# Patient Record
Sex: FEMALE | Race: WHITE | Employment: FULL TIME | URBAN - METROPOLITAN AREA
[De-identification: names, ages, dates, MRNs, and addresses within clinical notes are randomized per-mention and may not be internally consistent; named-entity substitution may affect disease eponyms.]

---

## 2020-09-17 ENCOUNTER — VIRTUAL VISIT (OUTPATIENT)
Dept: ENDOCRINOLOGY | Age: 25
End: 2020-09-17
Payer: COMMERCIAL

## 2020-09-17 ENCOUNTER — PATIENT MESSAGE (OUTPATIENT)
Dept: ENDOCRINOLOGY | Age: 25
End: 2020-09-17

## 2020-09-17 ENCOUNTER — TELEPHONE (OUTPATIENT)
Dept: ENDOCRINOLOGY | Age: 25
End: 2020-09-17

## 2020-09-17 PROCEDURE — 99204 OFFICE O/P NEW MOD 45 MIN: CPT | Performed by: INTERNAL MEDICINE

## 2020-09-17 RX ORDER — LEVOTHYROXINE SODIUM 0.1 MG/1
TABLET ORAL DAILY
COMMUNITY
Start: 2020-07-24 | End: 2020-12-22

## 2020-09-17 RX ORDER — LAMOTRIGINE 100 MG/1
TABLET ORAL
COMMUNITY
Start: 2020-09-14 | End: 2021-04-22

## 2020-09-17 RX ORDER — QUETIAPINE FUMARATE 200 MG/1
25 TABLET, FILM COATED ORAL
COMMUNITY
Start: 2020-09-11 | End: 2022-09-20

## 2020-09-17 RX ORDER — LAMOTRIGINE 150 MG/1
TABLET ORAL 2 TIMES DAILY
COMMUNITY
Start: 2020-08-18

## 2020-09-17 RX ORDER — LURASIDONE HYDROCHLORIDE 80 MG/1
20 TABLET, FILM COATED ORAL DAILY
COMMUNITY
Start: 2020-09-12 | End: 2022-09-20

## 2020-09-17 RX ORDER — NORGESTREL AND ETHINYL ESTRADIOL 0.3-0.03MG
KIT ORAL DAILY
COMMUNITY
Start: 2020-09-11 | End: 2021-04-22

## 2020-09-17 RX ORDER — METFORMIN HYDROCHLORIDE 750 MG/1
TABLET, EXTENDED RELEASE ORAL 2 TIMES DAILY
COMMUNITY
Start: 2020-09-08 | End: 2020-12-10

## 2020-09-17 RX ORDER — LITHIUM CARBONATE 300 MG/1
TABLET, FILM COATED, EXTENDED RELEASE ORAL 2 TIMES DAILY
COMMUNITY
Start: 2020-08-18 | End: 2021-09-02 | Stop reason: SINTOL

## 2020-09-17 SDOH — HEALTH STABILITY: MENTAL HEALTH: HOW OFTEN DO YOU HAVE A DRINK CONTAINING ALCOHOL?: NEVER

## 2020-09-17 NOTE — TELEPHONE ENCOUNTER
Kali message sent from patient:    Hello, I have two questions. First, I take Metformin 750 mg twice a day and I have been told to start taking both pills at the same time in the morning. But this is an extended release and I am not sure what the benefit is to take it all at the same time as opposed to once in the morning and once in the evening.

## 2020-09-17 NOTE — PROGRESS NOTES
SUBJECTIVE:  America Huang is a 23 y.o. female who is seen here for Hashimoto's hypothyroidism which she developed after starting lithium therapy for her bipolar disorder. Patient was diagnosed with Hypothyroidism approximately 2015 . Symptoms at presentation were   current complaints: fatigue- and abnormal TSH level of 45 in June 2015   history of obstructive symptoms: difficulty swallowing No, changes in voice/hoarseness No.history of radiation to patient's neck: NO  Recent iodine exposure: No  Family history includes no thyroid abnormalities. Family history of thyroid cancer:   Notes from [facility]s [facility]   \"Sajan was first seen in [facility] 8/2015 when she presented with PMH of bipolar disorder with psychotic features, who had abnormal screening thyroid studies performed while on lithium therapy. Lithium was started last March 2014, around the same time as a hospital admission for SI and psychosis. TSH at that time was only slightly elevated at 4.69 (0.43 - 4.00 mcIU/mL). Screening thyroid studies done on 6/5/15 showed low free T4: 0.6, elevated TSH: 45.8 and normal total T3: 0.73. Thyroglobulin antibodies were also positive at 750.2 ( IU/mL). 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 \"  Previously struggled with gender dysphoria in 2014 and was seen in the transgender clinic in November 2014. She was never placed on replacement and according to the parents at the time it was a phase she was too depressed at that time and hence did not know her feelings  Previously had severe mood disorders including severe depression and suicidal ideation.   She has been treated for ADHD in the past   She has bipolar dosirder  and has been on Lithium since 2015 and     Menarche at age 8   Heavy periods PMS and would become suicidal before starting her menstrual cycle so she was started on birth control ---she takes OCP       Past Medical History:   Diagnosis Date    Dysmenorrhea     Hyperlipidemia     Hypertension     Hypothyroidism     Vitamin D deficiency     Weight gain      There are no active problems to display for this patient. History reviewed. No pertinent surgical history.   Family History   Problem Relation Age of Onset    Cancer Paternal Grandmother     Hypothyroidism Paternal Grandfather      Social History     Socioeconomic History    Marital status: Single     Spouse name: None    Number of children: None    Years of education: None    Highest education level: None   Occupational History    None   Social Needs    Financial resource strain: None    Food insecurity     Worry: None     Inability: None    Transportation needs     Medical: None     Non-medical: None   Tobacco Use    Smoking status: Never Smoker    Smokeless tobacco: Never Used   Substance and Sexual Activity    Alcohol use: Never     Frequency: Never    Drug use: Never    Sexual activity: None   Lifestyle    Physical activity     Days per week: None     Minutes per session: None    Stress: None   Relationships    Social connections     Talks on phone: None     Gets together: None     Attends Mandaen service: None     Active member of club or organization: None     Attends meetings of clubs or organizations: None     Relationship status: None    Intimate partner violence     Fear of current or ex partner: None     Emotionally abused: None     Physically abused: None     Forced sexual activity: None   Other Topics Concern    None   Social History Narrative    None     Current Outpatient Medications   Medication Sig Dispense Refill    lithium (LITHOBID) 300 MG extended release tablet 2 times daily       LATUDA 80 MG TABS tablet daily       lamoTRIgine (LAMICTAL) 150 MG tablet Pt takes 1 tab in the am      lamoTRIgine (LAMICTAL) 100 MG tablet Pt takes 1 tab in the pm      QUEtiapine (SEROQUEL) 200 MG tablet 150 mg       metFORMIN (GLUCOPHAGE-XR) 750 MG extended release tablet 2 times daily       levothyroxine (SYNTHROID) 100 MCG tablet Daily       LOW-OGESTREL 0.3-30 MG-MCG per tablet daily        No current facility-administered medications for this visit. No Known Allergies      Review of Systems:  Constitutional  Patient denies any weight loss denies any weight gain,  Eyes   Pt denies any double vision blurred vision or decreased peripheral vision  Head ear nose throat  Denies any trouble swallowing denies any hoarseness  Denies any shortness of breath denies  Cardiovascular  Denies any chest pain denies any palpitations currently  Gastrointestinal  Denies any nausea ,vomiting ,constipation or diarrhea  Hematological/lymphatic  Denies any blood clot denies or any painful lymph nodes  Genitourinary  Denies any frequent urination denies any nighttime urination  Skin  Denies any acne denies any changes in facial body hair denies any non healing wounds Musculoskeletal   denies any joint or bone pain ,denies any muscle weakness ,denies any new fracture  Endocrine  Denies any excessive thirst denies any excessive heat intolerance or cold intolerance . OBJECTIVE:   There were no vitals taken for this visit.   Wt Readings from Last 3 Encounters:   No data found for Wt       Physical Exam:    Constitutional: no acute distress, well appearing and well nourished  Psychiatric: oriented to person, place and time, judgement and insight and normal, recent and remote memory intact and mood and affect are normal  Skin: skin and subcutaneous tissue is normal without visible mass,   Head and Face: visual inspection  of head and face revealed no abnormalities  Eyes: visual inspection showed no lid or conjunctival swelling, erythema or discharge, pupils are normal, equal, round  Ears/Nose: external inspection of ears and nose revealed no abnormalities, hearing is grossly normal  Oropharynx/Mouth/Face: lips, tongue and gums appear  normal with no lesions, the voice quality was normal  Neck: neck appears symmetric, with no visible masses,   Pulmonary: no increased work of breathing or signs of respiratory distress,  Musculoskeletal: normal on inspection    Neurological: normal coordination and normal general cortical function      Lab Review:  No results found for: TSH  No results found for: T4FREE     ASSESSMENT/PLAN:  1. Hypothyroidism due to medication  Patient was initially diagnosed as lithium-induced hypothyroidism appears that she had slightly abnormal thyroid function test prior to starting lithium and also had positive antithyroglobulin antibodies at that time. She is currently taking 100 mcg of levothyroxine without any concomitant medication  She is noted to have an elevated TSH of 5.77 in August 2020, patient thinks that it was due to the laxative she was taking and not absorbing her thyroid medication since then she has stopped the laxative and has no trouble with absorption and feels clinically euthyroid she was given blood work order to get blood work done now with thyroid hormone levels and repeat in 3 to 4 months    --She is noted to be on metformin as per her med list although patient did not mention that she has been taking the metformin.    - TSH without Reflex; Future  - Anti-Thyroglobulin Antibody; Future  - Thyroid Peroxidase Antibody; Future  - T4, Free; Future  - TSH without Reflex; Future  - T4, Free; Future    2.  Bipolar 1 disorder and generalized anxiety disorder     patient is on lithium for years appears to be stable currently  She also takes Seroquel and Latuda and Lamictal  She was previously following up with psychiatry at [facility] but now also follows with counselor at [facility] and/or ordered clinical lab results Yes  Reviewed and/or ordered radiology tests Yes   Reviewed and/or ordered other diagnostic tests Yes  Made a decision to obtain old records Yes  Reviewed and summarized old records Yes      Ciat Mendoza was counseled regarding symptoms of current diagnosis, course and complications of disease if inadequately treated, side effects of medications, diagnosis, treatment options, and prognosis, risks, benefits, complications, and alternatives of treatment, labs, imaging and other studies and treatment targets and goals. She understands instructions and counseling. TELEHEALTH EVALUATION -- Audio/Visual (During [address] emergency)  Pursuant to the emergency declaration under the [address] [place] [address], [facility] waiver authority and the [employer] and [employer] Act, this Virtual  Visit was conducted, with patient's consent, to reduce the patient's risk of exposure to COVID-19 and provide care for  patient. Services were provided through a video synchronous discussion virtually to substitute for in-person clinic visit. Patient's location : home     Patient provided verbal consent to use the video visit. Total time spent : 50 minutes reviewing the chart, conducting an interview, performing a limited exam by video and educating the patient on my assessment plan. Return in about 3 months (around 12/17/2020). Please note that some or all of this report was generated using voice recognition software. Please notify me in case of any questions about the content of this document, as some errors in transcription may have occurred .

## 2020-10-09 ENCOUNTER — PATIENT MESSAGE (OUTPATIENT)
Dept: ENDOCRINOLOGY | Age: 25
End: 2020-10-09

## 2020-10-12 ENCOUNTER — TELEPHONE (OUTPATIENT)
Dept: ENDOCRINOLOGY | Age: 25
End: 2020-10-12

## 2020-10-12 NOTE — TELEPHONE ENCOUNTER
From: Adán Rodríguez  To: Bonilla Londono MD  Sent: 10/9/2020 1:23 PM EDT  Subject: Prescription Question    Hello, I have worries about metformin because of news that it contains a carcinogen.   [email or web address]

## 2020-10-12 NOTE — TELEPHONE ENCOUNTER
Please advise patient due to conflicting data about Metformin at this time I would recommend she stay off of the Metformin and I would like to check her renal panel as well as A1c and a fasting lipid profile in a month after stopping Metformin.   We can decide at that time if he needs to restart it or not, hopefully she has a follow-up appointment approximately around the same time

## 2020-12-10 ENCOUNTER — VIRTUAL VISIT (OUTPATIENT)
Dept: ENDOCRINOLOGY | Age: 25
End: 2020-12-10
Payer: COMMERCIAL

## 2020-12-10 PROCEDURE — 99213 OFFICE O/P EST LOW 20 MIN: CPT | Performed by: INTERNAL MEDICINE

## 2020-12-10 RX ORDER — LINACLOTIDE 145 UG/1
CAPSULE, GELATIN COATED ORAL
COMMUNITY
Start: 2020-11-24 | End: 2021-04-22

## 2020-12-10 NOTE — PROGRESS NOTES
SUBJECTIVE:  Sherif Contreras is a 23 y.o. female who is seen here for Hashimoto's hypothyroidism which she developed after starting lithium therapy for her bipolar disorder. Patient was diagnosed with Hypothyroidism approximately 2015Patient was initially diagnosed as lithium-induced hypothyroidism appears that she had slightly abnormal thyroid function test prior to starting lithium and also had positive antithyroglobulin antibodies at that time. .  Symptoms at presentation were   current complaints: fatigue- and abnormal TSH level of 45 in June 2015   history of obstructive symptoms: difficulty swallowing No, changes in voice/hoarseness No.history of radiation to patient's neck: NO  Recent iodine exposure: No  Family history includes no thyroid abnormalities. Family history of thyroid cancer:   Notes from childrens [facility]   \"Sajan was first seen in [facility] 8/2015 when she presented with PMH of bipolar disorder with psychotic features, who had abnormal screening thyroid studies performed while on lithium therapy. Lithium was started last March 2014, around the same time as a hospital admission for SI and psychosis. TSH at that time was only slightly elevated at 4.69 (0.43 - 4.00 mcIU/mL). Screening thyroid studies done on 6/5/15 showed low free T4: 0.6, elevated TSH: 45.8 and normal total T3: 0.73. Thyroglobulin antibodies were also positive at 750.2 ( IU/mL). 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 \"  Previously struggled with gender dysphoria in 2014 and was seen in the transgender clinic in November 2014. She was never placed on replacement and according to the parents at the time it was a phase she was too depressed at that time and hence did not know her feelings  Previously had severe mood disorders including severe depression and suicidal ideation.   She has been treated for ADHD in the past   She has bipolar dosirder  and has been on Lithium since 2015 and     Menarche at age 8   Heavy periods PMS and would become suicidal before starting her menstrual cycle so she was started on birth control ---she takes OCP     INTERIM   She has gained a few lbs, she still struggles with severe constipation she has been using Linzess with little success. She denies any other heat or cold intolerance denies any polyuria     Past Medical History:   Diagnosis Date    Dysmenorrhea     Hyperlipidemia     Hypertension     Hypothyroidism     Vitamin D deficiency     Weight gain      There are no active problems to display for this patient. History reviewed. No pertinent surgical history.   Family History   Problem Relation Age of Onset    Cancer Paternal Grandmother     Hypothyroidism Paternal Grandfather      Social History     Socioeconomic History    Marital status: Single     Spouse name: None    Number of children: None    Years of education: None    Highest education level: None   Occupational History    None   Social Needs    Financial resource strain: None    Food insecurity     Worry: None     Inability: None    Transportation needs     Medical: None     Non-medical: None   Tobacco Use    Smoking status: Never Smoker    Smokeless tobacco: Never Used   Substance and Sexual Activity    Alcohol use: Never     Frequency: Never    Drug use: Never    Sexual activity: None   Lifestyle    Physical activity     Days per week: None     Minutes per session: None    Stress: None   Relationships    Social connections     Talks on phone: None     Gets together: None     Attends Yarsani service: None     Active member of club or organization: None     Attends meetings of clubs or organizations: None     Relationship status: None    Intimate partner violence     Fear of current or ex partner: None     Emotionally abused: None     Physically abused: None     Forced sexual activity: None   Other Topics Concern    None   Social History Narrative    None Current Outpatient Medications   Medication Sig Dispense Refill    LINZESS 145 MCG capsule       vitamin D (CHOLECALCIFEROL) 25 MCG (1000 UT) TABS tablet Take 1,000 Units by mouth      lithium (LITHOBID) 300 MG extended release tablet 2 times daily Pt takes 2 tabs in the am and 2 tabs in the pm      LATUDA 80 MG TABS tablet daily       lamoTRIgine (LAMICTAL) 150 MG tablet Pt takes 1 tab in the am      lamoTRIgine (LAMICTAL) 100 MG tablet Pt takes 1 tab in the pm      QUEtiapine (SEROQUEL) 200 MG tablet 100 mg       levothyroxine (SYNTHROID) 100 MCG tablet Daily       LOW-OGESTREL 0.3-30 MG-MCG per tablet daily        No current facility-administered medications for this visit. No Known Allergies      Review of Systems:  Constitutional  Patient denies any weight loss denies any weight gain,  Eyes   Pt denies any double vision blurred vision or decreased peripheral vision  Head ear nose throat  Denies any trouble swallowing denies any hoarseness  Denies any shortness of breath denies  Cardiovascular  Denies any chest pain denies any palpitations currently  Gastrointestinal  Denies any nausea ,vomiting , patient does complain of constipation or diarrhea  Hematological/lymphatic  Denies any blood clot denies or any painful lymph nodes  Genitourinary  Denies any frequent urination denies any nighttime urination  Skin  Denies any acne denies any changes in facial body hair denies any non healing wounds Musculoskeletal   denies any joint or bone pain ,denies any muscle weakness ,denies any new fracture  Endocrine  Denies any excessive thirst denies any excessive heat intolerance or cold intolerance . OBJECTIVE:   There were no vitals taken for this visit.   Wt Readings from Last 3 Encounters:   No data found for Wt       Physical Exam:    Constitutional: no acute distress, well appearing and well nourished  Psychiatric: oriented to person, place and time, judgement and insight and normal, recent and remote memory intact and mood and affect are normal  Skin: skin and subcutaneous tissue is normal without visible mass,   Head and Face: visual inspection  of head and face revealed no abnormalities  Eyes: visual inspection showed no lid or conjunctival swelling, erythema or discharge, pupils are normal, equal, round  Ears/Nose: external inspection of ears and nose revealed no abnormalities, hearing is grossly normal  Oropharynx/Mouth/Face: lips, tongue and gums appear  normal with no lesions, the voice quality was normal  Neck: neck appears symmetric, with no visible masses,   Pulmonary: no increased work of breathing or signs of respiratory distress,  Musculoskeletal: normal on inspection    Neurological: normal coordination and normal general cortical function      Lab Review:  No results found for: TSH  No results found for: T4FREE     ASSESSMENT/PLAN:    --- Hypothyroidism due to medication  She is currently taking 100 mcg of levothyroxine without any concomitant medication  She is noted to have an elevated TSH of 5.77 in August 2020,   She has not done any blood work recently I have advised her to get blood work done now and then will repeat before her next visit with me      Metformin stopped 3 months ago in September 2020, she thinks it was started to for weight loss, although review of chart it appears that because of her other psych meds like Seroquel and Latuda and Lamictal she was put on it for better metabolic profile    2.  Bipolar 1 disorder and generalized anxiety disorder     patient is on lithium for years appears to be stable currently  She also takes Seroquel and Latuda and Lamictal  She was previously following up with psychiatry at [facility] but now also follows with counselor at [facility] and/or ordered clinical lab results Yes  Reviewed and/or ordered radiology tests Yes   Reviewed and/or ordered other diagnostic tests Yes  Made a decision to obtain old records Yes  Reviewed and summarized old records Yes      Pollo Mireles was counseled regarding symptoms of current diagnosis, course and complications of disease if inadequately treated, side effects of medications, diagnosis, treatment options, and prognosis, risks, benefits, complications, and alternatives of treatment, labs, imaging and other studies and treatment targets and goals. She understands instructions and counseling. TELEHEALTH EVALUATION -- Audio/Visual (During [id number] public health emergency)  Pursuant to the emergency declaration under the [facility], [facility] waiver authority and the [employer] and [employer] Act, this Virtual  Visit was conducted, with patient's consent, to reduce the patient's risk of exposure to COVID-19 and provide care for  patient. Services were provided through a video synchronous discussion virtually to substitute for in-person clinic visit. Patient's location : home     Patient provided verbal consent to use the video visit. Total time spent : 15 minutes reviewing the chart, conducting an interview, performing a limited exam by video and educating the patient on my assessment plan. Return in about 3 months (around 3/10/2021). Please note that some or all of this report was generated using voice recognition software. Please notify me in case of any questions about the content of this document, as some errors in transcription may have occurred .

## 2020-12-10 NOTE — Clinical Note
Please email patient lab orders from the last visit as well as lab orders for the next appointment and please reji them as now and 3 months patient would need a follow-up in 3 months her email address is her [email or web address]

## 2020-12-18 NOTE — PROGRESS NOTES
Left VM for patient to call office back to schedule a f/u appt.  Reminder letter and lab orders emailed to patient per pt request.

## 2020-12-21 ENCOUNTER — TELEPHONE (OUTPATIENT)
Dept: ENDOCRINOLOGY | Age: 25
End: 2020-12-21

## 2020-12-22 ENCOUNTER — TELEPHONE (OUTPATIENT)
Dept: ENDOCRINOLOGY | Age: 25
End: 2020-12-22

## 2020-12-22 RX ORDER — LEVOTHYROXINE SODIUM 112 UG/1
112 TABLET ORAL DAILY
Qty: 30 TABLET | Refills: 3 | Status: SHIPPED | OUTPATIENT
Start: 2020-12-22 | End: 2021-04-26 | Stop reason: SDUPTHER

## 2021-04-11 ENCOUNTER — PATIENT MESSAGE (OUTPATIENT)
Dept: ENDOCRINOLOGY | Age: 26
End: 2021-04-11

## 2021-04-11 DIAGNOSIS — E03.2 HYPOTHYROIDISM DUE TO MEDICATION: Primary | ICD-10-CM

## 2021-04-12 ENCOUNTER — TELEPHONE (OUTPATIENT)
Dept: ENDOCRINOLOGY | Age: 26
End: 2021-04-12

## 2021-04-22 ENCOUNTER — VIRTUAL VISIT (OUTPATIENT)
Dept: ENDOCRINOLOGY | Age: 26
End: 2021-04-22
Payer: COMMERCIAL

## 2021-04-22 DIAGNOSIS — E55.9 VITAMIN D DEFICIENCY: ICD-10-CM

## 2021-04-22 DIAGNOSIS — E04.9 GOITER: ICD-10-CM

## 2021-04-22 DIAGNOSIS — E03.2 HYPOTHYROIDISM DUE TO MEDICATION: Primary | ICD-10-CM

## 2021-04-22 DIAGNOSIS — F31.9 BIPOLAR 1 DISORDER (HCC): ICD-10-CM

## 2021-04-22 PROCEDURE — 99213 OFFICE O/P EST LOW 20 MIN: CPT | Performed by: INTERNAL MEDICINE

## 2021-04-22 NOTE — Clinical Note
Please schedule patient a follow-up in 3 to 4 months and mail her the orders for the labs ordered in April some of them are supposed to be done now and some of them are going to be done at follow-up I have also placed an order for a thyroid ultrasound please mail her that she is going to get them done in [place] [id number]

## 2021-04-22 NOTE — PROGRESS NOTES
SUBJECTIVE:  Bia Song is a 27 y.o. female who is seen here for Hashimoto's hypothyroidism which she developed after starting lithium therapy for her bipolar disorder. Patient was diagnosed with Hypothyroidism approximately 2015Patient was initially diagnosed as lithium-induced hypothyroidism appears that she had slightly abnormal thyroid function test prior to starting lithium and also had positive antithyroglobulin antibodies at that time. .  Symptoms at presentation were   current complaints: fatigue and abnormal TSH level of 45 in June 2015   history of obstructive symptoms: difficulty swallowing No, changes in voice/hoarseness No.history of radiation to patient's neck: NO  Recent iodine exposure: No  Family history includes no thyroid abnormalities. Family history of thyroid cancer:   Notes from [facility]   \"Sajan was first seen in [facility] 8/2015 when she presented with PMH of bipolar disorder with psychotic features, who had abnormal screening thyroid studies performed while on lithium therapy. Lithium was started last March 2014, around the same time as a hospital admission for SI and psychosis. TSH at that time was only slightly elevated at 4.69 (0.43 - 4.00 mcIU/mL). Screening thyroid studies done on 6/5/15 showed low free T4: 0.6, elevated TSH: 45.8 and normal total T3: 0.73. Thyroglobulin antibodies were also positive at 750.2 ( IU/mL). 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 \"  Previously struggled with gender dysphoria in 2014 and was seen in the transgender clinic in November 2014. She was never placed on replacement and according to the parents at the time it was a phase she was too depressed at that time and hence did not know her feelings  Previously had severe mood disorders including severe depression and suicidal ideation.   She has been treated for ADHD in the past   She has bipolar dosirder  and has been on Lithium since 2015 and     Menarche at age 8   Heavy periods PMS and would become suicidal before starting her menstrual cycle so she was started on birth control ---she takes OCP     INTERIM   She denies any other heat or cold intolerance denies any polyuria   She has been trouble with swallowing and sometimes notes clicking noise in her throat  She is advised to get a thyroid ultrasound the orders have been placed, she was also advised to see an ear nose and throat doctor  It was advised that since she has not been seen in the clinic it is hard to make exact diagnosis so she should see a local ENT physician if the problem persists  Past Medical History:   Diagnosis Date    Dysmenorrhea     Hyperlipidemia     Hypertension     Hypothyroidism     Vitamin D deficiency     Weight gain      There are no active problems to display for this patient. History reviewed. No pertinent surgical history.   Family History   Problem Relation Age of Onset    Cancer Paternal Grandmother     Hypothyroidism Paternal Grandfather      Social History     Socioeconomic History    Marital status: Single     Spouse name: None    Number of children: None    Years of education: None    Highest education level: None   Occupational History    None   Social Needs    Financial resource strain: None    Food insecurity     Worry: None     Inability: None    Transportation needs     Medical: None     Non-medical: None   Tobacco Use    Smoking status: Never Smoker    Smokeless tobacco: Never Used   Substance and Sexual Activity    Alcohol use: Never     Frequency: Never    Drug use: Never    Sexual activity: None   Lifestyle    Physical activity     Days per week: None     Minutes per session: None    Stress: None   Relationships    Social connections     Talks on phone: None     Gets together: None     Attends Christian service: None     Active member of club or organization: None     Attends meetings of clubs or organizations: None Relationship status: None    Intimate partner violence     Fear of current or ex partner: None     Emotionally abused: None     Physically abused: None     Forced sexual activity: None   Other Topics Concern    None   Social History Narrative    None     Current Outpatient Medications   Medication Sig Dispense Refill    levothyroxine (SYNTHROID) 112 MCG tablet Take 1 tablet by mouth Daily 30 tablet 3    vitamin D (CHOLECALCIFEROL) 25 MCG (1000 UT) TABS tablet Take 1,000 Units by mouth      lithium (LITHOBID) 300 MG extended release tablet 2 times daily       LATUDA 80 MG TABS tablet daily       lamoTRIgine (LAMICTAL) 150 MG tablet 2 times daily       QUEtiapine (SEROQUEL) 200 MG tablet 100 mg        No current facility-administered medications for this visit. No Known Allergies      OBJECTIVE:   There were no vitals taken for this visit.   Wt Readings from Last 3 Encounters:   No data found for Wt       Physical Exam:    Constitutional: no acute distress, well appearing and well nourished  Psychiatric: oriented to person, place and time, judgement and insight and normal, recent and remote memory intact and mood and affect are normal  Skin: skin and subcutaneous tissue is normal without visible mass,   Head and Face: visual inspection  of head and face revealed no abnormalities  Eyes: visual inspection showed no lid or conjunctival swelling, erythema or discharge, pupils are normal, equal, round  Ears/Nose: external inspection of ears and nose revealed no abnormalities, hearing is grossly normal  Oropharynx/Mouth/Face: lips, tongue and gums appear  normal with no lesions, the voice quality was normal  Neck: neck appears symmetric, with no visible masses,   Pulmonary: no increased work of breathing or signs of respiratory distress,  Musculoskeletal: normal on inspection    Neurological: normal coordination and normal general cortical function      Lab Review:  Lab Results   Component Value Date    TSH 4.530 12/19/2020     No results found for: T4FREE     ASSESSMENT/PLAN:    --- Hypothyroidism due to medication  She is currently taking 112 mcg of levothyroxine without any concomitant offending medication  She is noted to have an elevated TSH of 5.77 in August 2020, TSH was still elevated at 4.5 in December 2020 so the dose was increased from 100 mcg to 112 mcg  She has not done any blood work recently I have advised her to get blood work done now and then will repeat before her next visit with me    Metabolic issues   Metformin stopped September 2020, she thinks it was started to for weight loss, although review of chart it appears that because of her other psych meds like Seroquel and Latuda and Lamictal she was put on it for better metabolic profile  Her J3G was 5.3 in December 2020 rest of the labs look good she does have elevated LDL she was advised to work on fats. Bipolar 1 disorder and generalized anxiety disorder     patient is on lithium for years appears to be stable currently  She also takes Seroquel and Latuda and Lamictal  She was previously following up with psychiatry at [facility] but now also follows with counselor at [facility] and/or ordered clinical lab results Yes  Reviewed and/or ordered radiology tests Yes   Reviewed and/or ordered other diagnostic tests Yes  Made a decision to obtain old records Yes  Reviewed and summarized old records Yes      Robin Gregory was counseled regarding symptoms of current diagnosis, course and complications of disease if inadequately treated, side effects of medications, diagnosis, treatment options, and prognosis, risks, benefits, complications, and alternatives of treatment, labs, imaging and other studies and treatment targets and goals. She understands instructions and counseling.       TELEHEALTH EVALUATION -- Audio/Visual (During [place]RIAX-49 public health emergency)  Pursuant to the emergency declaration under the [facility] Act and the [address] [facility] authority and the [employer] and [facility] Act, this Virtual  Visit was conducted, with patient's consent, to reduce the patient's risk of exposure to COVID-19 and provide care for  patient. Services were provided through a video synchronous discussion virtually to substitute for in-person clinic visit. Patient's location : home     Patient provided verbal consent to use the video visit. Total time spent :  20minutes reviewing the chart, conducting an interview, performing a limited exam by video and educating the patient on my assessment plan. Return in about 3 months (around 7/22/2021). Please note that some or all of this report was generated using voice recognition software. Please notify me in case of any questions about the content of this document, as some errors in transcription may have occurred .

## 2021-04-23 ENCOUNTER — PATIENT MESSAGE (OUTPATIENT)
Dept: ENDOCRINOLOGY | Age: 26
End: 2021-04-23

## 2021-04-23 DIAGNOSIS — E03.2 HYPOTHYROIDISM DUE TO MEDICATION: ICD-10-CM

## 2021-04-26 ENCOUNTER — TELEPHONE (OUTPATIENT)
Dept: ENDOCRINOLOGY | Age: 26
End: 2021-04-26

## 2021-04-26 RX ORDER — LEVOTHYROXINE SODIUM 112 UG/1
112 TABLET ORAL DAILY
Qty: 30 TABLET | Refills: 3 | Status: SHIPPED | OUTPATIENT
Start: 2021-04-26 | End: 2021-07-26

## 2021-04-26 NOTE — TELEPHONE ENCOUNTER
From: Robin Gregory  To: Etienne Andino MD  Sent: 4/23/2021 2:44 PM EDT  Subject: Helena Bynum, I would like to refill my prescription of Levothyroxine because I am running out of medication. The dosage is 112 mcg. Also, I would like to know if my hypothyroidism will be affected by stopping Lithium. Thank you!

## 2021-04-26 NOTE — TELEPHONE ENCOUNTER
Kali message sent from patient:    I would like to know if my hypothyroidism will be affected by stopping Lithium. Thank you!

## 2021-06-07 ENCOUNTER — TELEPHONE (OUTPATIENT)
Dept: ENDOCRINOLOGY | Age: 26
End: 2021-06-07

## 2021-06-07 NOTE — TELEPHONE ENCOUNTER
Yes you can get your blood work done right now you already have orders in the system and there is an order for a thyroid ultrasound which you can go ahead and get it done and we can assess your levels in see if there is need for adjustment in your thyroid medication

## 2021-06-07 NOTE — TELEPHONE ENCOUNTER
Akeneo message sent from patient:    Rm Crawley, I have been on 300 mg lithium for a few weeks and it may take longer for me to completely stop the medication. I am worried about my thyroid levels right now. My initial dose was 1200 mg and I have not had my levels tested yet. Would it be possible for me to get a test immediately just to be sure? Also when should I do the thyroid x-ray? Thank you.

## 2021-06-14 ENCOUNTER — TELEPHONE (OUTPATIENT)
Dept: ENDOCRINOLOGY | Age: 26
End: 2021-06-14

## 2021-06-21 ENCOUNTER — TELEPHONE (OUTPATIENT)
Dept: ENDOCRINOLOGY | Age: 26
End: 2021-06-21

## 2021-06-21 NOTE — TELEPHONE ENCOUNTER
Fax from [facility] w/ results for [address] thyroid from 6-21-21.  Pt's next appt is not until 8-19-21

## 2021-07-22 ENCOUNTER — PATIENT MESSAGE (OUTPATIENT)
Dept: ENDOCRINOLOGY | Age: 26
End: 2021-07-22

## 2021-07-22 ENCOUNTER — TELEPHONE (OUTPATIENT)
Dept: ENDOCRINOLOGY | Age: 26
End: 2021-07-22

## 2021-07-22 DIAGNOSIS — E03.2 HYPOTHYROIDISM DUE TO MEDICATION: Primary | ICD-10-CM

## 2021-07-22 DIAGNOSIS — E04.9 GOITER: ICD-10-CM

## 2021-07-22 NOTE — TELEPHONE ENCOUNTER
From: Ryley Santiago  Sent: 7/22/2021 11:57 AM EDT  To: [facility] Clinical Staff  Subject: RE: Non-Urgent Medical Question    I am on 112 mcg. Would it be possible to test for T3 and T4 as well? Thank you.

## 2021-07-22 NOTE — TELEPHONE ENCOUNTER
frooly message sent from patient:    Kirk Isaac. I have been dealing with severely unpleasant symptoms recently (constipation, weight loss, IBS/bloating, extreme anxiety, depression, moodiness, acne) and Im not sure where they are coming from. I suspect it might have to do with my Synthroid levels being too high considering I have been off Lithium for a month and my Synthroid dose has stayed the same as it was when I was on 1200mg. Would it be possible to meet earlier than August 19th (our appt date) and could you please send a thyroid blood test to [facility]? Thank you.

## 2021-07-24 ENCOUNTER — PATIENT MESSAGE (OUTPATIENT)
Dept: ENDOCRINOLOGY | Age: 26
End: 2021-07-24

## 2021-07-26 RX ORDER — LEVOTHYROXINE SODIUM 0.1 MG/1
100 TABLET ORAL DAILY
Qty: 90 TABLET | Refills: 1 | Status: SHIPPED | OUTPATIENT
Start: 2021-07-26 | End: 2021-08-23

## 2021-07-26 NOTE — TELEPHONE ENCOUNTER
Snap[employer] App message sent from patient:    Hello, I just got my TSH levels and they are low (0.423). Would it be possible to change my Synthroid dose? Also I havent gotten my T3 test yet; will I get it soon? I am worried about my results because my physical symptoms have become worse and unbearable. Thank you. Lab results are scanned in.

## 2021-07-26 NOTE — TELEPHONE ENCOUNTER
Your total T3 results are back and they are within normal limits, your TSH value of 0.4 is still within normal limits although in the face of the current symptoms we can reduce the dose of her levothyroxine from 112mcg to 100 mcg daily and see if it helps with your symptoms. Your symptoms could be from other conditions like anxiety. Let us see if reducing the dose of thyroid medication helps you with the symptoms. You take 100 mcg for 2 months or more please repeat your thyroid function test I am already placing the orders for the thyroid test as well as have sent the prescription with a lower dose to your pharmacy at [facility].

## 2021-08-19 ENCOUNTER — VIRTUAL VISIT (OUTPATIENT)
Dept: ENDOCRINOLOGY | Age: 26
End: 2021-08-19
Payer: COMMERCIAL

## 2021-08-19 DIAGNOSIS — E03.8 HYPOTHYROIDISM DUE TO HASHIMOTO'S THYROIDITIS: Primary | ICD-10-CM

## 2021-08-19 DIAGNOSIS — E06.3 HYPOTHYROIDISM DUE TO HASHIMOTO'S THYROIDITIS: Primary | ICD-10-CM

## 2021-08-19 DIAGNOSIS — F31.11 BIPOLAR AFFECTIVE DISORDER, CURRENTLY MANIC, MILD (HCC): ICD-10-CM

## 2021-08-19 PROCEDURE — 99213 OFFICE O/P EST LOW 20 MIN: CPT | Performed by: INTERNAL MEDICINE

## 2021-08-19 NOTE — PROGRESS NOTES
1 and     Menarche at age 8   Heavy periods PMS and would become suicidal before starting her menstrual cycle so she was started on birth control ---she takes OCP     INTERIM   She denies any other heat or cold intolerance denies any polyuria   She has been table to lose 20 lbs since stopping Lithium  She notes improvement in her constipation and bloating since reducing dose of LT4 on July 24, 2021    She is wondering if she would be able to get off Lt4 too eventually         Past Medical History:   Diagnosis Date    Dysmenorrhea     Hyperlipidemia     Hypertension     Hypothyroidism     Vitamin D deficiency     Weight gain      Patient Active Problem List    Diagnosis Date Noted    Hypothyroidism due to Hashimoto's thyroiditis 08/19/2021     No past surgical history on file. Family History   Problem Relation Age of Onset    Cancer Paternal Grandmother     Hypothyroidism Paternal Grandfather      Social History     Socioeconomic History    Marital status: Single     Spouse name: Not on file    Number of children: Not on file    Years of education: Not on file    Highest education level: Not on file   Occupational History    Not on file   Tobacco Use    Smoking status: Never Smoker    Smokeless tobacco: Never Used   Vaping Use    Vaping Use: Never used   Substance and Sexual Activity    Alcohol use: Never    Drug use: Never    Sexual activity: Not on file   Other Topics Concern    Not on file   Social History Narrative    Not on file     Social Determinants of Health     Financial Resource Strain:     Difficulty of Paying Living Expenses:    Food Insecurity:     Worried About Running Out of Food in the Last Year:     [address] in the Last Year:    Transportation Needs:     Lack of Transportation (Medical):      Lack of Transportation (Non-Medical):    Physical Activity:     Days of Exercise per Week:     Minutes of Exercise per Session:    Stress:     Feeling of Stress : follows with counselor at [facility] and/or ordered clinical lab results Yes  Reviewed and/or ordered radiology tests Yes   Reviewed and/or ordered other diagnostic tests Yes  Made a decision to obtain old records Yes  Reviewed and summarized old records Yes      Luigi Frye was counseled regarding symptoms of current diagnosis, course and complications of disease if inadequately treated, side effects of medications, diagnosis, treatment options, and prognosis, risks, benefits, complications, and alternatives of treatment, labs, imaging and other studies and treatment targets and goals. She understands instructions and counseling. TELEHEALTH EVALUATION -- Audio/Visual (During [facility] public health emergency)  Pursuant to the emergency declaration under the [facility] waiver authority and the [employer] and [employer] Act, this Virtual  Visit was conducted, with patient's consent, to reduce the patient's risk of exposure to COVID-19 and provide care for  patient. Services were provided through a video synchronous discussion virtually to substitute for in-person clinic visit. Patient's location : home     Patient provided verbal consent to use the video visit. Total time spent :  20minutes reviewing the chart, conducting an interview, performing a limited exam by video and educating the patient on my assessment plan. Return in about 3 months (around 11/19/2021). Please note that some or all of this report was generated using voice recognition software. Please notify me in case of any questions about the content of this document, as some errors in transcription may have occurred .

## 2021-08-23 ENCOUNTER — TELEPHONE (OUTPATIENT)
Dept: ENDOCRINOLOGY | Age: 26
End: 2021-08-23

## 2021-08-23 DIAGNOSIS — E03.8 HYPOTHYROIDISM DUE TO HASHIMOTO'S THYROIDITIS: Primary | ICD-10-CM

## 2021-08-23 DIAGNOSIS — E06.3 HYPOTHYROIDISM DUE TO HASHIMOTO'S THYROIDITIS: Primary | ICD-10-CM

## 2021-08-23 RX ORDER — LEVOTHYROXINE SODIUM 0.07 MG/1
75 TABLET ORAL DAILY
Qty: 90 TABLET | Refills: 1 | Status: SHIPPED | OUTPATIENT
Start: 2021-08-23 | End: 2021-10-22 | Stop reason: SDUPTHER

## 2021-08-23 NOTE — TELEPHONE ENCOUNTER
Minitrade message sent from patient:    Hello, I got my test results back. TPO was normal (< 3.00, normal level < 5.59) and thyroglobulin antibodies were also normal (46.2, you told me the upper limit is 114). My thyroglobulin antibodies have decreased from 750 to 46. However, TSH was low (0.13). TSH has been decreasing from 4.53 to 1.45 to 0.423 to 0.13 and I worry it will only decrease with this level of Synthroid. Would it be possible to decrease my Synthroid to 75 mcg or lower (preferably lower)? My thyroid is beginning to work normally now that I am off lithium. Thank you.

## 2021-09-02 ENCOUNTER — OFFICE VISIT (OUTPATIENT)
Dept: PRIMARY CARE CLINIC | Age: 26
End: 2021-09-02
Payer: COMMERCIAL

## 2021-09-02 ENCOUNTER — PATIENT MESSAGE (OUTPATIENT)
Dept: PRIMARY CARE CLINIC | Age: 26
End: 2021-09-02

## 2021-09-02 VITALS
OXYGEN SATURATION: 98 % | HEIGHT: 62 IN | DIASTOLIC BLOOD PRESSURE: 70 MMHG | BODY MASS INDEX: 25.28 KG/M2 | HEART RATE: 82 BPM | WEIGHT: 137.4 LBS | SYSTOLIC BLOOD PRESSURE: 100 MMHG

## 2021-09-02 DIAGNOSIS — E06.3 HYPOTHYROIDISM DUE TO HASHIMOTO'S THYROIDITIS: ICD-10-CM

## 2021-09-02 DIAGNOSIS — E03.8 HYPOTHYROIDISM DUE TO HASHIMOTO'S THYROIDITIS: ICD-10-CM

## 2021-09-02 DIAGNOSIS — Z00.00 ANNUAL PHYSICAL EXAM: Primary | ICD-10-CM

## 2021-09-02 DIAGNOSIS — K58.1 IRRITABLE BOWEL SYNDROME WITH CONSTIPATION: ICD-10-CM

## 2021-09-02 PROCEDURE — 99385 PREV VISIT NEW AGE 18-39: CPT | Performed by: STUDENT IN AN ORGANIZED HEALTH CARE EDUCATION/TRAINING PROGRAM

## 2021-09-02 ASSESSMENT — ENCOUNTER SYMPTOMS
COLOR CHANGE: 0
CONSTIPATION: 1
ABDOMINAL PAIN: 1
COUGH: 0
SORE THROAT: 0
SINUS PRESSURE: 0
VOMITING: 0
NAUSEA: 0
DIARRHEA: 0
EYE REDNESS: 0
SHORTNESS OF BREATH: 0

## 2021-09-02 ASSESSMENT — PATIENT HEALTH QUESTIONNAIRE - PHQ9
SUM OF ALL RESPONSES TO PHQ QUESTIONS 1-9: 0
1. LITTLE INTEREST OR PLEASURE IN DOING THINGS: 0
SUM OF ALL RESPONSES TO PHQ QUESTIONS 1-9: 0
2. FEELING DOWN, DEPRESSED OR HOPELESS: 0
SUM OF ALL RESPONSES TO PHQ9 QUESTIONS 1 & 2: 0
SUM OF ALL RESPONSES TO PHQ QUESTIONS 1-9: 0

## 2021-09-02 NOTE — PATIENT INSTRUCTIONS
Patient Education        Learning About the Low FODMAP Diet for Irritable Bowel Syndrome (IBS)  What is the low-FODMAP diet? A low-FODMAP diet is a way to find out what foods give you digestion problems. You stop eating certain high-FODMAP foods for about 2 months. Then you add them back to see how your body reacts. This is called a \"challenge diet. \" A dietitian or doctor can help you follow this diet. FODMAPs are carbohydrates. They are in many types of foods. FODMAP stands for:  · F ermentable. · O ligosaccharides. · D isaccharides. · M onosaccharides. · A nd p olyols. If you have digestive problems, some of these foods can make your symptoms worse. When you are on this diet, you can still eat certain fruits and vegetables. You can also eat certain grains, meats, fish, and lactose-free milks. What is it used for? If you have irritable bowel syndrome (IBS), you can ease your symptoms by not eating some types of foods. Some people also use this diet for inflammatory bowel disease (IBD) or some food intolerances. High-FODMAP foods can be hard to digest. They pull more fluid into your intestines. They are also easily fermented. This can lead to bloating, belly pain, gas, and diarrhea. The low-FODMAP diet can help you figure out what foods to avoid. And it can help you find foods that are easier to digest.  This diet can help with symptoms of some digestive diseases. But it's not a cure. You will still need to manage your condition. How does it work? You will work with a doctor or dietitian when you start the diet. At first, you won't eat any high-FODMAP foods for a few weeks. Go to www.[employer]. [employer] to learn more about this diet. Aleyda Ivey also find links to an anthony for your phone or other device. You'll find low-FODMAP cookbooks there too. After 6 to 8 weeks, you will start to try high-FODMAP foods again. You will add those foods back to your diet, one group at a time.  Your doctor or dietitian will probably have you wait a few days before you add each new group of those foods. Keep a food diary. You can write down the foods you try and note how they make you feel. After a few weeks, you may have a better idea of what foods you should avoid and what foods make you feel your best.  What are the risks? There is some risk of not getting all of the vitamins and nutrients you need on the low-FODMAP diet. These include:  · Folate. · Thiamin. · Vitamin B6.  · Calcium. · Vitamin D. Your dietitian or doctor can help you find other sources of these if needed. This diet may limit your fiber intake. Try to plan your meals to include other sources of fiber. What foods are on the low-FODMAP diet? Here is a guide to foods that you can eat, plus the foods that you should avoid, when you are on the low-FODMAP diet. Grains  Okay to eat: Foods made from grains like arrowroot, buckwheat, corn, millet, and oats. You can also eat potato, quinoa, rice, sorghum, tapioca, and teff. Cereals, pasta, breads, corn tortillas and baked goods made from these grains are also okay. (These grains may be labeled \"gluten-free. \")  Avoid: Grains like wheat, barley, and rye. Avoid ingredients such as bulgur, couscous, durum, and semolina. And avoid cereals, breads, and pastas made from these grains. Avoid chickpea, lentil, and pea flour. Proteins  Okay to eat: Most meat, fish, and eggs without high-FODMAP sauces. You can have small amounts of almonds or hazelnuts (10 nuts). Macadamia nuts, peanuts, pecans, pine nuts, and walnuts are also okay. You can also eat arnoldo and pumpkin seeds, tofu, and tempeh. Avoid: Beans, chickpeas, lentils, and soybeans. Avoid pistachio and cashew nuts. And some sausages may have high-FODMAP ingredients. Dairy  Okay to eat: Lactose-free dairy milks. Rice milk and almond milk are okay. So are lactose-free yogurts, kefirs, ice creams, and sorbet from low-FODMAP fruits and sweeteners.  (These are often labeled \"lactose-free. \") You can have small amounts (2 Tbsp) of cottage, cream, or ricotta cheese. Hard cheeses like cheddar, Ethel, ROSS, and Swiss are okay. So are small amounts (1 oz) of aged or ripened cheeses like Brie, blue, and feta. Avoid: Milk, including cow, goat, and sheep. Avoid condensed or evaporated milk, buttermilk, custard, cream, sour cream, yogurt, and ice cream. Avoid soy milk. (Check sauces for dairy ingredients.)  Vegetables  Okay to eat: Bamboo shoots, bell peppers, bok nathan, up to ½ cup of broccoli or cabbage (red or white), and cucumbers. Eggplant, green beans, lettuce, olives, parsnips, and potatoes are okay to eat. So are pumpkin, rutabaga, seaweed, sprouts, Swiss chard, and spinach. You can eat scallions (green part only) and squash (not butternut). You can eat tomatoes, turnips, watercress, yams, and zucchini. You can also have small amounts of artichoke hearts (from can, 1 oz), carrots, corn (½ cob), and sweet potato (½ cup). Avoid: Artichokes, asparagus, [place] sprouts, julianne cabbage, cauliflower, and celery. And avoid garlic, leeks, mushrooms, okra, onions, scallions (white part), shallots, and peas. Fruits  Okay to eat: Bananas, blueberries, cantaloupe, coconut, grapes, and honeydew. Kiwi, emiliano, limes, oranges, passion fruit, papaya, and pineapple are also okay. You can eat plantain, raspberries, rhubarb, star fruit, strawberries, tangelo, and tangerine. You can also have small amounts of dried banana chips (up to 10 chips), dried cranberries (1 Tbsp), and shredded coconut (up to ¼ cup). Avoid: Apples, applesauce, apricots, avocados, blackberries, boysenberries, and cherries. Also avoid dates, figs, grapefruit, guava, lychee, and mangoes. Don't eat nectarines, peaches, pears, persimmon, plums, prunes, tamarillo, or watermelon. And limit most canned and dried fruits.   Oils, spices, condiments, and sweeteners  Okay to eat: Vegetable oils (including garlic infused), butter, ghee, lard, and margarine (no trans fat). You can have most fresh herbs like basil, chives, coriander, caity, parsley, rosemary and thyme. You can have salt, jams made from low-FODMAP fruits, mayonnaise, and mustard. Soy sauce, hot sauce (no garlic), tamari, and vinegar are also okay. Sweeteners that are okay include sugar (sucrose), powdered (confectioner's) sugar, brown sugar, glucose, and maple syrup. You can also have some artificial sweeteners like aspartame, saccharine, and stevia. Avoid: Chutneys, hummus, jellies, garlic sauces, and gravies made with onion or garlic. Avoid pickles, relish, some salad dressings and soup stocks, salsa, and tomato paste. And avoid sauces and other foods with high fructose corn syrup, honey, molasses, and agave. Avoid artificial sweeteners (isomalt, mannitol, malitol, sorbitol, and xylitol). Avoid corn syrup solids, fructose, fruit juice concentrate, and polydextrose. Other foods and drinks  Okay to have: Water, soda water, tonic, soft drinks sweetened with sugar, ½ cup of low-FODMAP fruit juice, and most teas and alcohols. You can also eat foods made with baking powder and soda, cocoa, and gelatin. Avoid: Juices from high-FODMAP fruits and vegetables. And avoid fortified marco, chamomile and fennel teas, chicory-based drinks and coffee substitutes, and bouillon cubes. Follow-up care is a key part of your treatment and safety. Be sure to make and go to all appointments, and call your doctor if you are having problems. It's also a good idea to know your test results and keep a list of the medicines you take. Where can you learn more? Go to https://essence.[employer]Rueda.com. org and sign in to your StyleQ account. Enter L235 in the [facility] box to learn more about \"Learning About the Low FODMAP Diet for Irritable Bowel Syndrome (IBS). \"     If you do not have an account, please click on the \"Sign Up Now\" link.   Current as of: December 17, 2020               Content Version: 12.9  © 9753-9589 Healthwise, Incorporated. Care instructions adapted under license by [facility] ([facility]). If you have questions about a medical condition or this instruction, always ask your healthcare professional. [address] any warranty or liability for your use of this information.

## 2021-09-02 NOTE — PROGRESS NOTES
Identification & History  Patient: Sofy Evans   : 1995 MRN: <[id number]>   Chief Complaint   Patient presents with    Establish Care     Pt. would like to address hx of ibs     Sofy Speaker is a 27 y.o. 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 Has been off lithium for 2 months now (was started on it 3/2015)    #Bipolar d/o: seems to be PTSD form what she understands. Was told she had BP with psychosis that presented itself after starting an SSRI. Stopped SSRI and psychosis went away. Is seeing a psychiatrist for this who is managing her medications but is looking to get a new counselor. #IBS with constipation: bloating. Has had symptoms for several years dating back to childhood. Was taking Linzess but seemed to be getting better so backed off and would like to use a lower dose. Currently taking Linzess 290. Labs done recently, normal CBC, renal, lipids, LFT's, but low TSH, elevated thyroglobulin, and normal TPO and T3/T4. #HCM: pap, flu vaccine declined. Needs 2 more doses of HPV vaccine. ROS  Review of Systems   Constitutional: Negative for chills and fever. HENT: Negative for congestion, sinus pressure and sore throat. Eyes: Negative for redness and visual disturbance. Respiratory: Negative for cough and shortness of breath. Cardiovascular: Negative for chest pain and leg swelling. Gastrointestinal: Positive for abdominal pain and constipation. Negative for diarrhea, nausea and vomiting. Genitourinary: Negative for difficulty urinating and dysuria. Musculoskeletal: Negative for arthralgias and joint swelling. Skin: Negative for color change and rash.    Neurological: Negative for dizziness and headaches. Psychiatric/Behavioral: Negative for dysphoric mood. The patient is not nervous/anxious. PMH  Past Medical History:   Diagnosis Date    Hypothyroidism     Vitamin D deficiency     Weight Loss      History reviewed. No pertinent surgical history. Family History   Problem Relation Age of Onset    Cancer Paternal Grandmother     Hypothyroidism Paternal Grandfather     Alzheimer's Disease Paternal Grandfather      Social History     Tobacco Use    Smoking status: Never Smoker    Smokeless tobacco: Never Used   Vaping Use    Vaping Use: Never used   Substance Use Topics    Alcohol use: Never    Drug use: Never     Allergies   Allergen Reactions    Food Swelling     Lips and mouth swell  Ragweed allergy       MEDS  Current Outpatient Medications   Medication Instructions    lamoTRIgine (LAMICTAL) 150 MG tablet 2 TIMES DAILY    Latuda 60 mg, DAILY, I tab at bedtime    levothyroxine (SYNTHROID) 75 mcg, Oral, DAILY    linaCLOtide (LINZESS) 72 mcg, Oral, DAILY BEFORE BREAKFAST    QUEtiapine (SEROQUEL) 100 mg    vitamin D (CHOLECALCIFEROL) 1,000 Units, Oral       PHYSICAL EXAMINATION  /70 (Site: Left Upper Arm, Position: Sitting, Cuff Size: Small Adult)   Pulse 82   Ht 5' 2\" (1.575 m)   Wt 137 lb 6.4 oz (62.3 kg)   SpO2 98%   BMI 25.13 kg/m²   Physical Exam  Vitals reviewed. Constitutional:       General: She is not in acute distress. Appearance: She is not ill-appearing, toxic-appearing or diaphoretic. HENT:      Head: Normocephalic and atraumatic. Eyes:      General: No scleral icterus. Extraocular Movements: Extraocular movements intact. Pupils: Pupils are equal, round, and reactive to light. Cardiovascular:      Rate and Rhythm: Normal rate and regular rhythm. Heart sounds: No murmur heard. No friction rub. Pulmonary:      Effort: No respiratory distress. Breath sounds: Normal breath sounds.  No wheezing, rhonchi or rales. Abdominal:      General: There is no distension. Palpations: Abdomen is soft. Tenderness: There is no abdominal tenderness. Musculoskeletal:      Cervical back: Normal range of motion. Skin:     General: Skin is warm and dry. Neurological:      General: No focal deficit present. Mental Status: She is alert and oriented to person, place, and time. Psychiatric:         Attention and Perception: Attention normal.         Mood and Affect: Affect is flat. Speech: Speech normal.         Behavior: Behavior is withdrawn. Thought Content: Thought content normal.         A/P  1. Annual physical exam  Has had routine labs done recently which look good. Is due for Pap smear and needs her second and third dose of HPV vaccine. Does not want flu vaccine today. 2. Hypothyroidism due to Hashimoto's thyroiditis vs lithium induced thyroid changes  TSH was low in last couple of weeks so her endocrinologist decreased her dose to 75 mcg. She is interested in getting another opinion and has an idea of who she would want to see. - External Referral to Endocrinology    3. Irritable bowel syndrome with constipation  Has had IBS with constipation for years. Has recently felt like it has been improved little bit so wants to decrease her dose of Linzess. - linaCLOtide (LINZESS) 72 MCG CAPS capsule; Take 1 capsule by mouth every morning (before breakfast)  Dispense: 30 capsule; Refill: 3      Mychal Euceda MD  Internal Medicine and Sports Medicine    Please note that this chart was at least partially generated using dragon dictation software. Although every effort was made to ensure the accuracy of this automated transcription, some errors in transcription may have occurred.

## 2021-09-03 NOTE — TELEPHONE ENCOUNTER
From: Sajan Urias  To: Lily Webber MD  Sent: 9/2/2021 10:25 PM EDT  Subject: Non-Urgent Medical Question    Hello, I have reoccurring bronchitis (it happens once a year and lasts a few weeks) and I was wondering how at risk I am for COVID. Will I experience more severe symptoms? I had my second dose of Pfizer in April and I am worried the effectiveness has worn off. 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 How soon can I get another COVID shot and what should I do to prevent getting COVID? Thank you.

## 2021-09-08 ENCOUNTER — PATIENT MESSAGE (OUTPATIENT)
Dept: PRIMARY CARE CLINIC | Age: 26
End: 2021-09-08

## 2021-09-13 ENCOUNTER — PATIENT MESSAGE (OUTPATIENT)
Dept: PRIMARY CARE CLINIC | Age: 26
End: 2021-09-13

## 2021-09-13 DIAGNOSIS — Z23 IMMUNIZATION DUE: Primary | ICD-10-CM

## 2021-09-16 ENCOUNTER — OFFICE VISIT (OUTPATIENT)
Dept: ORTHOPEDIC SURGERY | Age: 26
End: 2021-09-16
Payer: COMMERCIAL

## 2021-09-16 ENCOUNTER — PATIENT MESSAGE (OUTPATIENT)
Dept: ORTHOPEDIC SURGERY | Age: 26
End: 2021-09-16

## 2021-09-16 VITALS
WEIGHT: 136.3 LBS | HEIGHT: 62 IN | DIASTOLIC BLOOD PRESSURE: 72 MMHG | SYSTOLIC BLOOD PRESSURE: 108 MMHG | BODY MASS INDEX: 25.08 KG/M2 | TEMPERATURE: 97.2 F | HEART RATE: 81 BPM

## 2021-09-16 DIAGNOSIS — M94.0 COSTOCHONDRITIS, ACUTE: ICD-10-CM

## 2021-09-16 DIAGNOSIS — J20.9 ACUTE BRONCHITIS, UNSPECIFIED ORGANISM: Primary | ICD-10-CM

## 2021-09-16 DIAGNOSIS — E03.9 HYPOTHYROIDISM, UNSPECIFIED TYPE: ICD-10-CM

## 2021-09-16 PROCEDURE — 99215 OFFICE O/P EST HI 40 MIN: CPT | Performed by: STUDENT IN AN ORGANIZED HEALTH CARE EDUCATION/TRAINING PROGRAM

## 2021-09-16 RX ORDER — ALBUTEROL SULFATE 90 UG/1
2 AEROSOL, METERED RESPIRATORY (INHALATION) 4 TIMES DAILY PRN
Qty: 18 G | Refills: 5 | Status: SHIPPED | OUTPATIENT
Start: 2021-09-16 | End: 2022-09-20

## 2021-09-16 NOTE — PROGRESS NOTES
CC:   Chief Complaint   Patient presents with    Breathing Problem     Difficulty taking deep breaths, has not noticed it getting worse in humidity or warm weather.  Shortness of Breath     Pt. states it is really hard to breathe with a mask on and that she is short of breath all the time.  Chest Pain     Pain when taking deep breaths that often leads to coughing, on and off. SHDAY Lawson is a 27 y.o. female here for an acute visit for shortness of breath, cough. 1 week of SOB, chest pain with coughing, has gotten a bit of a headache and been tired as well. Symptoms were worse last week, symptoms much better now. 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 She has had bronchitis in the past and this feels similar though less severe. She has gotten better with antibiotics in the past but doesn't feel it is as severe this time and thinks that may be overkill. No rhinorrhea, congestion, fevers, chills, ab pain, diarrhea. She would also like to get her TSH rechecked after her recent dose change while getting her zoster titer checked for school. Review of Systems   As above. Vitals:    09/16/21 1018   BP: 108/72   Site: Right Upper Arm   Position: Sitting   Cuff Size: Small Adult   Pulse: 81   Temp: 97.2 °F (36.2 °C)   Weight: 136 lb 4.8 oz (61.8 kg)   Height: 5' 2\" (1.575 m)     Physical Exam  Vitals reviewed. Constitutional:       General: She is not in acute distress. Appearance: She is not ill-appearing, toxic-appearing or diaphoretic. HENT:      Right Ear: Tympanic membrane, ear canal and external ear normal.      Left Ear: Tympanic membrane, ear canal and external ear normal.      Nose: No congestion or rhinorrhea. Mouth/Throat:      Pharynx: Posterior oropharyngeal erythema present. No oropharyngeal exudate. Eyes:      General: No scleral icterus. Extraocular Movements: Extraocular movements intact.       Pupils: Pupils are equal, round, and reactive to light. Cardiovascular:      Rate and Rhythm: Normal rate. Pulses: Normal pulses. Heart sounds: No murmur heard. No friction rub. No gallop. Pulmonary:      Effort: Pulmonary effort is normal. No respiratory distress. Breath sounds: No stridor. No wheezing. Comments: Some coarse breath sounds bilaterally  Chest:      Chest wall: Tenderness present. Abdominal:      General: Bowel sounds are normal. There is no distension. Tenderness: There is no abdominal tenderness. There is no guarding. Musculoskeletal:      Cervical back: Normal range of motion and neck supple. No rigidity. Lymphadenopathy:      Cervical: No cervical adenopathy. Skin:     General: Skin is warm. Findings: No rash. Neurological:      General: No focal deficit present. Mental Status: She is alert and oriented to person, place, and time. Psychiatric:         Mood and Affect: Mood normal.         Behavior: Behavior normal.         A/P  1. Acute bronchitis, unspecified organism  Her symptoms are most consistent with an acute viral bronchitis and symptoms have been improving over the past week. We will give her an albuterol inhaler to see if this helps with her symptoms and cough. - albuterol sulfate HFA (VENTOLIN HFA) 108 (90 Base) MCG/ACT inhaler; Inhale 2 puffs into the lungs 4 times daily as needed for Wheezing  Dispense: 18 g; Refill: 5    2. Costochondritis, acute  Is having symptoms consistent with costochondritis due to her URI. Symptoms are worse with movement and coughing and she does have some tenderness to palpation today. Will trial Voltaren gel and Tylenol as she cannot take oral NSAIDs.  - diclofenac sodium (VOLTAREN) 1 % GEL; Apply topically 2 times daily  Dispense: 2 g; Refill: 0    3. Hypothyroidism, unspecified type  Last TSH was low so had a dose adjustment of her Synthroid. We will recheck TSH level today.  - TSH with Reflex; Future    4.  Need for school paperwork to be filled out  Lacking Men A and zoster vaccination results. - ordered zoster titer     Laura Mortensen MD  Internal Medicine and Sports Medicine    Please note that this chart was at least partially generated using dragon dictation software. Although every effort was made to ensure the accuracy of this automated transcription, some errors in transcription may have occurred. On this date, 9/16/2021 I have spent greater than 40 minutes reviewing previous notes, test results, and coordinating care as well as documenting and discussing the plan of care with the patient.

## 2021-09-28 ENCOUNTER — OFFICE VISIT (OUTPATIENT)
Dept: ORTHOPEDIC SURGERY | Age: 26
End: 2021-09-28
Payer: COMMERCIAL

## 2021-09-28 VITALS
BODY MASS INDEX: 25.23 KG/M2 | SYSTOLIC BLOOD PRESSURE: 111 MMHG | WEIGHT: 137.1 LBS | TEMPERATURE: 97.4 F | DIASTOLIC BLOOD PRESSURE: 73 MMHG | HEIGHT: 62 IN | HEART RATE: 104 BPM

## 2021-09-28 DIAGNOSIS — J40 BRONCHITIS: Primary | ICD-10-CM

## 2021-09-28 PROCEDURE — 99214 OFFICE O/P EST MOD 30 MIN: CPT | Performed by: STUDENT IN AN ORGANIZED HEALTH CARE EDUCATION/TRAINING PROGRAM

## 2021-09-28 RX ORDER — PREDNISONE 20 MG/1
20 TABLET ORAL 2 TIMES DAILY
Qty: 10 TABLET | Refills: 0 | Status: SHIPPED | OUTPATIENT
Start: 2021-09-28 | End: 2021-10-03

## 2021-09-28 RX ORDER — AZITHROMYCIN 250 MG/1
250 TABLET, FILM COATED ORAL SEE ADMIN INSTRUCTIONS
Qty: 6 TABLET | Refills: 0 | Status: SHIPPED | OUTPATIENT
Start: 2021-09-28 | End: 2021-10-03

## 2021-09-28 NOTE — PROGRESS NOTES
distension. Tenderness: There is no abdominal tenderness. There is no guarding. Musculoskeletal:      Cervical back: Normal range of motion and neck supple. No rigidity. Lymphadenopathy:      Cervical: No cervical adenopathy. Skin:     General: Skin is warm. Findings: No rash. Neurological:      General: No focal deficit present. Mental Status: She is alert and oriented to person, place, and time. Psychiatric:         Mood and Affect: Mood is anxious. Behavior: Behavior normal.         A/P  1. Bronchitis  I recently saw her for similar symptoms and we treated her as a viral bronchitis and gave her albuterol inhaler which has helped a little bit. At this point in time she is very anxious about her symptoms and we discussed Covid and vaccines and mass at length due to her concerns. Her risk of PE or DVT are very low and she has no known risk factors and normal pulse ox. We will start her on azithromycin and treat her as an atypical pneumonia or bacterial bronchitis will get a chest x-ray to evaluate for any other abnormalities. Also start her on a few days of prednisone as some of her symptoms sound a bit like an asthma attack (although she has no known history of asthma). We did discuss symptoms that would be suggestive of PE and would require a visit to the ER for further evaluation. - XR Chest PA and Lateral; Future  - azithromycin (ZITHROMAX) 250 MG tablet; Take 1 tablet by mouth See Admin Instructions for 5 days 500mg on day 1 followed by 250mg on days 2 - 5  Dispense: 6 tablet; Refill: 0  - predniSONE (DELTASONE) 20 MG tablet; Take 1 tablet by mouth 2 times daily for 5 days  Dispense: 10 tablet; Refill: 0    Follow-up as needed    Cece Moore MD  Internal Medicine and Sports Medicine    Please note that this chart was at least partially generated using dragon dictation software.   Although every effort was made to ensure the accuracy of this automated transcription, some errors in transcription may have occurred.

## 2021-10-05 ENCOUNTER — PATIENT MESSAGE (OUTPATIENT)
Dept: ORTHOPEDIC SURGERY | Age: 26
End: 2021-10-05

## 2021-10-07 ENCOUNTER — OFFICE VISIT (OUTPATIENT)
Dept: ORTHOPEDIC SURGERY | Age: 26
End: 2021-10-07
Payer: COMMERCIAL

## 2021-10-07 VITALS
SYSTOLIC BLOOD PRESSURE: 115 MMHG | TEMPERATURE: 97.5 F | BODY MASS INDEX: 25.12 KG/M2 | DIASTOLIC BLOOD PRESSURE: 76 MMHG | WEIGHT: 136.5 LBS | HEIGHT: 62 IN | HEART RATE: 100 BPM

## 2021-10-07 DIAGNOSIS — J30.2 SEASONAL ALLERGIES: ICD-10-CM

## 2021-10-07 DIAGNOSIS — R06.02 SHORTNESS OF BREATH: Primary | ICD-10-CM

## 2021-10-07 PROCEDURE — 99214 OFFICE O/P EST MOD 30 MIN: CPT | Performed by: STUDENT IN AN ORGANIZED HEALTH CARE EDUCATION/TRAINING PROGRAM

## 2021-10-07 NOTE — PROGRESS NOTES
CC:   Chief Complaint   Patient presents with    Shortness of Breath     Worsens with activity. Gasping for air during the night and having trouble sleeping. SHADY Lin is a 27 y.o. female here for a follow-up appointment for SOB. She has been having difficulty for a few weeks now with some cough and viral symptoms. We did treat her with azithromycin and prednisone on 9/28/21 which didn't seem to help. She reports her symptoms are about the same with some SOB which is worse with exertion. She is only rarely having a cough, no sinus pressure/congestion, fevers, chills, or diarrhea. The SOB is worse at night as well. She reports that these symptoms had started before she got sick, but it did worsen afterwards. She does get seasonal allergies. Review of Systems   As above. Vitals:    10/07/21 1014   BP: 115/76   Site: Left Upper Arm   Position: Sitting   Cuff Size: Small Adult   Pulse: 100   Temp: 97.5 °F (36.4 °C)   Weight: 136 lb 8 oz (61.9 kg)   Height: 5' 2\" (1.575 m)     Physical Exam  Vitals reviewed. Constitutional:       General: She is not in acute distress. Appearance: She is not ill-appearing or toxic-appearing. HENT:      Head: Normocephalic and atraumatic. Eyes:      General: No scleral icterus. Extraocular Movements: Extraocular movements intact. Pupils: Pupils are equal, round, and reactive to light. Cardiovascular:      Rate and Rhythm: Normal rate and regular rhythm. Heart sounds: No murmur heard. No friction rub. Pulmonary:      Effort: No respiratory distress. Breath sounds: Normal breath sounds. No wheezing, rhonchi or rales. Comments: Speaking in full sentences with absolutely no difficulty. Abdominal:      General: There is no distension. Palpations: Abdomen is soft. Tenderness: There is no abdominal tenderness. Musculoskeletal:      Cervical back: Normal range of motion. Right lower leg: No edema.       Left lower leg: No edema. Skin:     General: Skin is warm and dry. Neurological:      General: No focal deficit present. Mental Status: She is alert and oriented to person, place, and time. Psychiatric:         Mood and Affect: Mood normal.         Behavior: Behavior normal.         A/P  1. Shortness of breath, Seasonal allergies  Her symptoms have been present since prior to her or URI. Her lungs sound completely clear again today and we recently did a chest x-ray and had her on a course of steroids and antibiotic which have failed to improve her symptoms. I do not think that she has a PE or DVT as she has not had any of the other suggestive of symptoms. However, she does have seasonal allergies and her symptoms seem to be worse/almost exclusively at night which makes me wonder about asthma. She is not having much of a cough though. We will have her start using Flonase for the next couple of weeks and we will get her scheduled for pulmonary function test as well. - Ambulatory referral to PFT  -Flonase for the next couple of weeks  -Follow-up as needed or after pulmonary function test  -Continue albuterol as needed    Beatriz Mcdermott MD  Internal Medicine and Sports Medicine    Please note that this chart was at least partially generated using dragon dictation software. Although every effort was made to ensure the accuracy of this automated transcription, some errors in transcription may have occurred.

## 2021-10-18 ENCOUNTER — PATIENT MESSAGE (OUTPATIENT)
Dept: ORTHOPEDIC SURGERY | Age: 26
End: 2021-10-18

## 2021-10-18 DIAGNOSIS — N91.2 AMENORRHEA: Primary | ICD-10-CM

## 2021-10-19 NOTE — TELEPHONE ENCOUNTER
From: Krunal Valentine  To: Fabienne Willingham MD  Sent: 10/18/2021 9:56 PM EDT  Subject: Non-Urgent Medical Question    Dear Dr. Kvng Rosa,    I am having an issue where I have skipped a period and I have not had a period in 2 months. My period was supposed to start last month and the second one should have started around now. I wonder if this is due to stress or any other issue. My psychiatrist told me it is not due to my meds and my thyroid levels are normal now so it cannot be due to thyroid issues. I am also not sexually active. What would you think the issue is and would an appointment to check be necessary? Or if not possible, could you refer me to an OB-GYN in [facility]?     Thank you,  Sajan

## 2021-10-21 ENCOUNTER — PATIENT MESSAGE (OUTPATIENT)
Dept: ORTHOPEDIC SURGERY | Age: 26
End: 2021-10-21

## 2021-10-21 ENCOUNTER — PATIENT MESSAGE (OUTPATIENT)
Dept: ENDOCRINOLOGY | Age: 26
End: 2021-10-21

## 2021-10-21 DIAGNOSIS — E03.2 HYPOTHYROIDISM DUE TO MEDICATION: Primary | ICD-10-CM

## 2021-10-22 RX ORDER — LEVOTHYROXINE SODIUM 0.07 MG/1
75 TABLET ORAL DAILY
Qty: 90 TABLET | Refills: 1 | Status: SHIPPED | OUTPATIENT
Start: 2021-10-22 | End: 2022-02-01

## 2021-10-22 NOTE — TELEPHONE ENCOUNTER
From: Jose Frye  To: Peña Valdivia MD  Sent: 10/21/2021 6:10 PM EDT  Subject: Taisha Godfrey, my thyroid test results were normal last month so I am continuing the 75mcg Synthroid. [facility] only gives me 100mcg. I would like to get the 75mcg from [facility] rather than [facility] because it is closer to my apartment. Thank you.

## 2021-11-04 ENCOUNTER — OFFICE VISIT (OUTPATIENT)
Dept: ORTHOPEDIC SURGERY | Age: 26
End: 2021-11-04
Payer: COMMERCIAL

## 2021-11-04 VITALS — OXYGEN SATURATION: 98 % | DIASTOLIC BLOOD PRESSURE: 67 MMHG | SYSTOLIC BLOOD PRESSURE: 106 MMHG | HEART RATE: 94 BPM

## 2021-11-04 DIAGNOSIS — F25.0 SCHIZOAFFECTIVE DISORDER, BIPOLAR TYPE (HCC): Primary | ICD-10-CM

## 2021-11-04 PROCEDURE — 99214 OFFICE O/P EST MOD 30 MIN: CPT | Performed by: STUDENT IN AN ORGANIZED HEALTH CARE EDUCATION/TRAINING PROGRAM

## 2021-11-04 NOTE — PROGRESS NOTES
CC:   Chief Complaint   Patient presents with    Shortness of Breath     Difficulty breathing. PFT results normal. Negative CXR. Worsens with activity.  Chest Pain     Pt. said she ran on Monday morning for about a minute to catch the bus. She said her lungs were burning and it hurt to take a breath. This feeling lasted the entire day. HPI  Va Richards is a 27 y.o. female here for a follow-up appointment for SOB. After walking even for 5 minutes she feels like she runs out of breath really fast. It is worse with a mask on. Feels that she runs out of breath when she is talking even. On Monday she ran about a minute to the bus and felt like her lungs were burning. \"Felt like lungs were collapsing and falling apart\". Reports the sensation lasted the entire day. Walks for about an hour/day for exercise and this makes her feel a bit SOB too. We have done an x-ray, abx, PFT's all of which are normal.    Review of Systems  As above. Vitals:    11/04/21 1033   BP: 106/67   Site: Left Upper Arm   Position: Sitting   Cuff Size: Small Adult   Pulse: 94   SpO2: 98%     Physical Exam  Vitals reviewed. Constitutional:       General: She is not in acute distress. Appearance: She is not ill-appearing or toxic-appearing. HENT:      Head: Normocephalic and atraumatic. Eyes:      General: No scleral icterus. Extraocular Movements: Extraocular movements intact. Pupils: Pupils are equal, round, and reactive to light. Cardiovascular:      Rate and Rhythm: Normal rate and regular rhythm. Heart sounds: No murmur heard. No friction rub. No gallop. Pulmonary:      Effort: No respiratory distress. Breath sounds: Normal breath sounds. No stridor. No wheezing, rhonchi or rales. Abdominal:      General: There is no distension. Palpations: Abdomen is soft. Tenderness: There is no abdominal tenderness. Musculoskeletal:      Cervical back: Normal range of motion.       Right lower leg: No edema. Left lower leg: No edema. Skin:     General: Skin is warm and dry. Neurological:      General: No focal deficit present. Mental Status: She is alert and oriented to person, place, and time. Psychiatric:         Mood and Affect: Mood normal.         Speech: Speech is not rapid and pressured. Behavior: Behavior normal.       I reviewed her pulmonary function test results from 10/18/2021 which we have scanned into the media tab. Her findings were within normal limits. A/P  1. Schizoaffective disorder, bipolar type ([address]  She has been having these symptoms of shortness of breath since getting ill a month or so ago. At that time the treated for bronchitis and had mentioned that this was not asthma-like process which she seemed to cling to. Since that time she has describes some subjective shortness of breath with daily activities but almost exclusively while wearing her N95. We discussed her symptoms at length today, we have done a thorough work-up up to this point as well. We are both in agreement that this is most likely psychosomatic in origin given her worsening anxiety since moving into an apartment this year. She has also noticed an increase in her IBS symptoms during this time. We are in agreement the best next step would be to have her work closely with her psychologist and psychiatrist to help her with coping skills and make any medication adjustments as necessary. Follow-up as needed. Jazzmine Villanueva MD  Internal Medicine and Sports Medicine    Please note that this chart was at least partially generated using dragon dictation software. Although every effort was made to ensure the accuracy of this automated transcription, some errors in transcription may have occurred.     On this date, 11/4/2021 I have spent greater than 30 minutes reviewing previous notes, test results, and coordinating care as well as documenting and discussing the plan of care with the patient.

## 2022-01-18 ENCOUNTER — TELEPHONE (OUTPATIENT)
Dept: ENDOCRINOLOGY | Age: 27
End: 2022-01-18

## 2022-01-18 DIAGNOSIS — E03.8 HYPOTHYROIDISM DUE TO HASHIMOTO'S THYROIDITIS: Primary | ICD-10-CM

## 2022-01-18 DIAGNOSIS — E06.3 HYPOTHYROIDISM DUE TO HASHIMOTO'S THYROIDITIS: Primary | ICD-10-CM

## 2022-01-18 NOTE — TELEPHONE ENCOUNTER
Please advise patient I placed lab orders I would wait for at least 2 to 3 weeks after stopping Seroquel, to repeat her labs and then again in 2 to 3 months

## 2022-01-18 NOTE — TELEPHONE ENCOUNTER
[employer] message sent from patient:    Dear Dr. Ron Villavicencio,     I recently stopped taking Seroquel, which affected my thyroid, and I would like to take a TSH lab as I have not done so in months. Could you please send labs to [facility]? And how long will it take for Seroquel to leave my system and for my thyroid to return to normal?      Thank you,  Sajan     Would it also be possible to order the other lab tests too? I dont know what theyre called but lipid panel is one of them. Thank you.       I think its called metabolic panel

## 2022-03-30 ENCOUNTER — PATIENT MESSAGE (OUTPATIENT)
Dept: ORTHOPEDIC SURGERY | Age: 27
End: 2022-03-30

## 2022-03-30 DIAGNOSIS — K58.1 IRRITABLE BOWEL SYNDROME WITH CONSTIPATION: ICD-10-CM

## 2022-03-31 NOTE — TELEPHONE ENCOUNTER
From: Sajan Urias  To: Dr. Martin Wolf: 3/30/2022 9:58 PM EDT  Subject: Janay Cole, I believe you prescribed the Linzess to [facility]. I use the [facility] now. Would it be possible to send a refill to [facility]? Thank you.

## 2022-05-09 ENCOUNTER — TELEPHONE (OUTPATIENT)
Dept: ORTHOPEDIC SURGERY | Age: 27
End: 2022-05-09

## 2022-05-09 NOTE — TELEPHONE ENCOUNTER
Patient called in stating she has a stomach bug. She has commencement tomorrow and is wondering if she should go. She also stated that a viral stomach bug has been going around campus.

## 2022-05-17 ENCOUNTER — PATIENT MESSAGE (OUTPATIENT)
Dept: ENDOCRINOLOGY | Age: 27
End: 2022-05-17

## 2022-05-17 DIAGNOSIS — E03.2 HYPOTHYROIDISM DUE TO MEDICATION: Primary | ICD-10-CM

## 2022-05-17 DIAGNOSIS — E03.2 HYPOTHYROIDISM DUE TO MEDICATION: ICD-10-CM

## 2022-05-17 RX ORDER — LEVOTHYROXINE SODIUM 0.07 MG/1
TABLET ORAL
Qty: 90 TABLET | Refills: 1 | Status: SHIPPED | OUTPATIENT
Start: 2022-05-17

## 2022-05-17 RX ORDER — LEVOTHYROXINE SODIUM 0.07 MG/1
TABLET ORAL
Qty: 90 TABLET | Refills: 1 | Status: SHIPPED | OUTPATIENT
Start: 2022-05-17 | End: 2022-05-17 | Stop reason: SDUPTHER

## 2022-05-17 NOTE — TELEPHONE ENCOUNTER
Pt scheduled for 09/20/22 due to class schedule. Pt was informed this is in person due to state guidelines and will need to keep the appt for any future refills.

## 2022-06-06 NOTE — TELEPHONE ENCOUNTER
mobiManage message sent to patient with information. Methotrexate Pregnancy And Lactation Text: This medication is Pregnancy Category X and is known to cause fetal harm. This medication is excreted in breast milk.

## 2022-07-20 ENCOUNTER — PATIENT MESSAGE (OUTPATIENT)
Dept: ENDOCRINOLOGY | Age: 27
End: 2022-07-20

## 2022-07-20 DIAGNOSIS — E03.2 HYPOTHYROIDISM DUE TO MEDICATION: Primary | ICD-10-CM

## 2022-07-20 DIAGNOSIS — E06.3 HYPOTHYROIDISM DUE TO HASHIMOTO'S THYROIDITIS: ICD-10-CM

## 2022-07-20 DIAGNOSIS — E03.8 HYPOTHYROIDISM DUE TO HASHIMOTO'S THYROIDITIS: ICD-10-CM

## 2022-07-20 NOTE — TELEPHONE ENCOUNTER
From: Sajan Urias  To: Dr. Rajni Coker  Sent: 7/20/2022 11:59 AM EDT  Subject: Lab Order    Hello, I believe you sent me a script for a TSH test but I would also like to do a metabolic and lipid panel in addition to it. Would you be able to send a script for all of these tests for blood work? Thank you.

## 2022-08-02 DIAGNOSIS — K58.1 IRRITABLE BOWEL SYNDROME WITH CONSTIPATION: ICD-10-CM

## 2022-08-02 RX ORDER — LINACLOTIDE 72 UG/1
CAPSULE, GELATIN COATED ORAL
Qty: 30 CAPSULE | Refills: 3 | Status: SHIPPED | OUTPATIENT
Start: 2022-08-02 | End: 2022-09-07 | Stop reason: SDUPTHER

## 2022-08-13 LAB
ALBUMIN SERPL-MCNC: 4.8 G/DL (ref 3.5–5)
ALP BLD-CCNC: 58 IU/L (ref 35–104)
ALT SERPL-CCNC: 12 IU/L (ref 10–35)
ANION GAP SERPL CALCULATED.3IONS-SCNC: 10 MMOL/L (ref 6–18)
AST SERPL-CCNC: 13 IU/L (ref 10–35)
BILIRUB SERPL-MCNC: 0.4 MG/DL (ref 0–1)
BUN BLDV-MCNC: 16 MG/DL (ref 8–26)
CALCIUM SERPL-MCNC: 9.6 MG/DL (ref 8.4–10.2)
CHLORIDE BLD-SCNC: 106 MEQ/L (ref 98–111)
CHOLESTEROL, TOTAL: 201 MG/DL
CO2: 23 MMOL/L (ref 21–31)
CREAT SERPL-MCNC: 0.79 MG/DL (ref 0.44–1.03)
EGFR (CKD-EPI): 106 ML/MIN/1.73 M2
GLUCOSE BLD-MCNC: 90 MG/DL (ref 70–99)
HDLC SERPL-MCNC: 83 MG/DL
LDL CHOLESTEROL CALCULATED: 112 MG/DL
NONHDLC SERPL-MCNC: 118 MG/DL
POTASSIUM SERPL-SCNC: 4 MEQ/L (ref 3.6–5.1)
SODIUM BLD-SCNC: 139 MEQ/L (ref 135–145)
T4 FREE: 1.25 NG/DL (ref 0.93–1.7)
TOTAL PROTEIN: 7.2 G/DL (ref 6.6–8.7)
TRIGL SERPL-MCNC: 32 MG/DL
TSH ULTRASENSITIVE: 1.93 MCIU/ML (ref 0.27–4.2)

## 2022-09-04 DIAGNOSIS — K58.1 IRRITABLE BOWEL SYNDROME WITH CONSTIPATION: ICD-10-CM

## 2022-09-20 ENCOUNTER — OFFICE VISIT (OUTPATIENT)
Dept: ENDOCRINOLOGY | Age: 27
End: 2022-09-20
Payer: COMMERCIAL

## 2022-09-20 VITALS
BODY MASS INDEX: 25.58 KG/M2 | RESPIRATION RATE: 16 BRPM | HEART RATE: 97 BPM | HEIGHT: 62 IN | WEIGHT: 139 LBS | SYSTOLIC BLOOD PRESSURE: 111 MMHG | DIASTOLIC BLOOD PRESSURE: 76 MMHG

## 2022-09-20 DIAGNOSIS — E06.3 HYPOTHYROIDISM DUE TO HASHIMOTO'S THYROIDITIS: Primary | ICD-10-CM

## 2022-09-20 DIAGNOSIS — E66.3 OVERWEIGHT (BMI 25.0-29.9): ICD-10-CM

## 2022-09-20 DIAGNOSIS — E03.8 HYPOTHYROIDISM DUE TO HASHIMOTO'S THYROIDITIS: Primary | ICD-10-CM

## 2022-09-20 PROBLEM — F25.0 SCHIZOAFFECTIVE DISORDER, BIPOLAR TYPE (HCC): Status: RESOLVED | Noted: 2019-10-17 | Resolved: 2022-09-20

## 2022-09-20 PROCEDURE — 99214 OFFICE O/P EST MOD 30 MIN: CPT | Performed by: INTERNAL MEDICINE

## 2022-09-20 NOTE — PROGRESS NOTES
SUBJECTIVE:  Chasidy  is a 24 y.o. female who is seen here for Hashimoto's hypothyroidism which she developed after starting lithium therapy for her bipolar disorder. Patient was diagnosed with Hypothyroidism approximately 2015Patient was initially diagnosed as lithium-induced hypothyroidism appears that she had slightly abnormal thyroid function test prior to starting lithium and also had positive antithyroglobulin antibodies at that time. .  Symptoms at presentation were   current complaints: fatigue- and abnormal TSH level of 45 in June 2015   history of obstructive symptoms: difficulty swallowing No, changes in voice/hoarseness No.history of radiation to patient's neck: NO  Recent iodine exposure: No  Family history includes no thyroid abnormalities. Family history of thyroid cancer:   Notes from [facility]s [facility]   \"Sajan was first seen in [facility] 8/2015 when she presented with PMH of bipolar disorder with psychotic features, who had abnormal screening thyroid studies performed while on lithium therapy. Lithium was started last March 2014, around the same time as a hospital admission for SI and psychosis. TSH at that time was only slightly elevated at 4.69 (0.43 - 4.00 mcIU/mL). Screening thyroid studies done on 6/5/15 showed low free T4: 0.6, elevated TSH: 45.8 and normal total T3: 0.73. Thyroglobulin antibodies were also positive at 750.2 ( IU/mL). 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 \"    Previously struggled with gender dysphoria in 2014 and was seen in the transgender clinic in November 2014. She was never placed on replacement and according to the parents at the time it was a phase she was too depressed at that time and hence did not know her feelings  Previously had severe mood disorders including severe depression and suicidal ideation.   She has been treated for ADHD in the past   She has bipolar dosirder  and has been on Lithium since 3 and     Menarche at age 8   Heavy periods PMS and would become suicidal before starting her menstrual cycle so she was started on birth control ---she takes OCP   ---she lost 25 lbs after stopping Lithium     INTERIM       Weight has been stable   Dad moved to [employer]   She has been treated with Lamictal for Bipolar affective disorder   She has been taking 75 mcg TSH 1.9 in August 2022         Past Medical History:   Diagnosis Date    Hypothyroidism     Overweight (BMI 25.0-29.9) 9/20/2022    Vitamin D deficiency     Weight Loss      Patient Active Problem List    Diagnosis Date Noted    Overweight (BMI 25.0-29.9) 09/20/2022    Hypothyroidism due to Hashimoto's thyroiditis 08/19/2021     History reviewed. No pertinent surgical history. Family History   Problem Relation Age of Onset    Cancer Paternal Grandmother     Hypothyroidism Paternal Grandfather     Alzheimer's Disease Paternal Grandfather      Social History     Socioeconomic History    Marital status: Single     Spouse name: None    Number of children: None    Years of education: None    Highest education level: None   Tobacco Use    Smoking status: Never    Smokeless tobacco: Never   Vaping Use    Vaping Use: Never used   Substance and Sexual Activity    Alcohol use: Never    Drug use: Never     Current Outpatient Medications   Medication Sig Dispense Refill    linaCLOtide (LINZESS) 72 MCG CAPS capsule TAKE ONE CAPSULE BY MOUTH EVERY MORNING BEFORE BREAKFAST 30 capsule 3    levothyroxine (SYNTHROID) 75 MCG tablet TAKE 1 TABLET BY MOUTH EVERY DAY 90 tablet 1    lamoTRIgine (LAMICTAL) 150 MG tablet 2 times daily        No current facility-administered medications for this visit.      Allergies   Allergen Reactions    Food Swelling     Lips and mouth swell  Ragweed allergy         OBJECTIVE:   /76   Pulse 97   Resp 16   Ht 5' 2\" (1.575 m)   Wt 139 lb (63 kg)   LMP 08/24/2022 (Approximate)   BMI 25.42 kg/m²   Wt Readings from Last 3 Encounters:   09/20/22 139 lb (63 kg)   10/07/21 136 lb 8 oz (61.9 kg)   09/28/21 137 lb 1.6 oz (62.2 kg)       Physical Exam:    Constitutional: no acute distress, well appearing and well nourished  Psychiatric: oriented to person, place and time, judgement and insight and normal, recent and remote memory intact and mood and affect are normal  Skin: skin and subcutaneous tissue is normal without visible mass,   Head and Face: visual inspection  of head and face revealed no abnormalities  Eyes: visual inspection showed no lid or conjunctival swelling, erythema or discharge, pupils are normal, equal, round  Ears/Nose: external inspection of ears and nose revealed no abnormalities, hearing is grossly normal  Oropharynx/Mouth/Face: lips, tongue and gums appear  normal with no lesions, the voice quality was normal  Neck: neck appears symmetric, with no visible masses,   Pulmonary: no increased work of breathing or signs of respiratory distress,  Musculoskeletal: normal on inspection    Neurological: normal coordination and normal general cortical function      Lab Review:  Lab Results   Component Value Date/Time    TSH 1.930 08/13/2022 11:05 AM    TSH 0.780 02/01/2022 04:33 PM    TSH 0.130 08/20/2021 03:15 PM     Lab Results   Component Value Date/Time    T4FREE 1.25 08/13/2022 11:05 AM        ASSESSMENT/PLAN:    ---Hashimotos  Hypothyroidism   She is currently taking  75 mcg of levothyroxine since July 26 , 2021 she notes improvement in constipation   Sajan Lopezta is currently biochemicaly  and clinically euthyroid on the  current dose of thyroid hormone replacement . Patient  is advised to take her thyroid hormone supplement on empty stomach without any concomitant Iron or calcium supplement .       Her antithyroglobulin antibodies came back elevated at more than 40 whereas the upper limit of normal was for her TPO though was negative    Sleep disorder   Hasn't slept for 9 months     Hx of Metabolic issues   Metformin stopped September 2020, she thinks it was started to for weight loss, although review of chart it appears that because of her other psych meds like Seroquel and Latuda and Lamictal she was put on it for better metabolic profile  Her O9R was 5.3 in December 2020 rest of the labs look good she does have elevated LDL she was advised to work on fats. HX of Bipolar 1 disorder and generalized anxiety disorder   She is on Lamictal.   Pt wants to remove thid diagnosis   Lithium stopped in June 2021   She stopped Seroquel & Latuda in dec 2021   Insomnia worsened since stopping Seroquel but takes Melatonin       Reviewed and/or ordered clinical lab results Yes  Reviewed and/or ordered radiology tests Yes   Reviewed and/or ordered other diagnostic tests Yes  Made a decision to obtain old records Yes  Reviewed and summarized old records Yes    I spent  30 + minutes which includes reviewing patient chart , interpreting previous lab results  , discussing and providing counseling and coordinating care of patient's multiple health issues with  the patient. Misael Narayanan was counseled regarding symptoms of current diagnosis, course and complications of disease if inadequately treated, side effects of medications, diagnosis, treatment options, and prognosis, risks, benefits, complications, and alternatives of treatment, labs, imaging and other studies and treatment targets and goals. She understands instructions and counseling. Return in about 1 year (around 9/20/2023). Please note that some or all of this report was generated using voice recognition software. Please notify me in case of any questions about the content of this document, as some errors in transcription may have occurred .

## 2022-10-03 DIAGNOSIS — K58.1 IRRITABLE BOWEL SYNDROME WITH CONSTIPATION: ICD-10-CM

## 2022-12-07 DIAGNOSIS — K58.1 IRRITABLE BOWEL SYNDROME WITH CONSTIPATION: ICD-10-CM

## 2023-01-06 DIAGNOSIS — K58.1 IRRITABLE BOWEL SYNDROME WITH CONSTIPATION: ICD-10-CM

## 2023-02-23 ENCOUNTER — PATIENT MESSAGE (OUTPATIENT)
Dept: FAMILY MEDICINE CLINIC | Age: 28
End: 2023-02-23

## 2023-02-23 DIAGNOSIS — K58.1 IRRITABLE BOWEL SYNDROME WITH CONSTIPATION: ICD-10-CM

## 2023-02-24 NOTE — TELEPHONE ENCOUNTER
From: Sajan Urias  To: Dr. Long Pump: 2/23/2023 5:11 PM EST  Subject: Linzess refill    Hello,    My Linzess prescription will need to be refilled in 15 days, and I would like to know if I can get a refill before I get an appointment with a new doctor for my prescriptions. I scheduled an appointment in March, but that will be after you leave [facility] I just want to make sure I will be able to get a refill before that time so I don't run out of [facility]. Thank you.

## 2023-07-26 ENCOUNTER — PATIENT MESSAGE (OUTPATIENT)
Dept: ENDOCRINOLOGY | Age: 28
End: 2023-07-26

## 2023-07-26 DIAGNOSIS — F31.11 BIPOLAR AFFECTIVE DISORDER, CURRENTLY MANIC, MILD (HCC): Primary | ICD-10-CM

## 2023-07-27 NOTE — TELEPHONE ENCOUNTER
From: Alphonse Martínez  Sent: 7/27/2023 10:07 AM EDT  To: [facility] Clinical Staff  Subject: Thyroid test    Monday at 3:20 works for me. I will get my labs done tomorrow. Would it be possible for you to add a Lithium level lab as well? Thank you.

## 2023-07-28 LAB
ALBUMIN SERPL-MCNC: 4.7 G/DL (ref 3.5–5.2)
ALP BLD-CCNC: 50 IU/L (ref 36–123)
ALT SERPL-CCNC: 12 IU/L (ref 10–40)
ANION GAP SERPL CALCULATED.3IONS-SCNC: 10 MMOL/L (ref 4–16)
AST SERPL-CCNC: 15 IU/L (ref 10–40)
BILIRUB SERPL-MCNC: 0.6 MG/DL (ref 0–1.2)
BUN BLDV-MCNC: 13 MG/DL (ref 8–26)
CALCIUM SERPL-MCNC: 9.8 MG/DL (ref 8.5–10.4)
CHLORIDE BLD-SCNC: 102 MEQ/L (ref 98–111)
CHOLESTEROL, TOTAL: 222 MG/DL
CO2: 22 MMOL/L (ref 21–31)
CREAT SERPL-MCNC: 0.63 MG/DL (ref 0.6–1.2)
EGFR (CKD-EPI): 125 ML/MIN/1.73 M2
GLUCOSE BLD-MCNC: 80 MG/DL (ref 70–99)
HDLC SERPL-MCNC: 86 MG/DL
LDL CHOLESTEROL CALCULATED: 124 MG/DL
LITHIUM LEVEL: 0.2 MMOL/L (ref 0.6–1.2)
NONHDLC SERPL-MCNC: 136 MG/DL
POTASSIUM SERPL-SCNC: 3.6 MEQ/L (ref 3.6–5.1)
SODIUM BLD-SCNC: 134 MEQ/L (ref 135–145)
T3 TOTAL: 1.28 NG/ML (ref 0.8–2)
T4 FREE: 1.42 NG/DL (ref 0.93–1.7)
THYROID PEROXIDASE ANTIBODIES: <3 IU/ML
TOTAL PROTEIN: 7.8 G/DL (ref 6.4–8.3)
TRIGL SERPL-MCNC: 59 MG/DL
TSH ULTRASENSITIVE: 2.78 MCIU/ML (ref 0.27–4.2)

## 2023-07-31 ENCOUNTER — OFFICE VISIT (OUTPATIENT)
Dept: ENDOCRINOLOGY | Age: 28
End: 2023-07-31
Payer: COMMERCIAL

## 2023-07-31 VITALS
BODY MASS INDEX: 25.5 KG/M2 | SYSTOLIC BLOOD PRESSURE: 110 MMHG | DIASTOLIC BLOOD PRESSURE: 72 MMHG | RESPIRATION RATE: 16 BRPM | TEMPERATURE: 98 F | OXYGEN SATURATION: 99 % | HEART RATE: 86 BPM | WEIGHT: 139.4 LBS

## 2023-07-31 DIAGNOSIS — E06.3 HYPOTHYROIDISM DUE TO HASHIMOTO'S THYROIDITIS: Primary | ICD-10-CM

## 2023-07-31 DIAGNOSIS — E66.3 OVERWEIGHT (BMI 25.0-29.9): ICD-10-CM

## 2023-07-31 DIAGNOSIS — F31.11 BIPOLAR AFFECTIVE DISORDER, CURRENTLY MANIC, MILD (HCC): ICD-10-CM

## 2023-07-31 DIAGNOSIS — E03.2 HYPOTHYROIDISM DUE TO MEDICATION: ICD-10-CM

## 2023-07-31 DIAGNOSIS — E03.8 HYPOTHYROIDISM DUE TO HASHIMOTO'S THYROIDITIS: Primary | ICD-10-CM

## 2023-07-31 PROCEDURE — 99214 OFFICE O/P EST MOD 30 MIN: CPT | Performed by: INTERNAL MEDICINE

## 2023-07-31 RX ORDER — LITHIUM CARBONATE 300 MG/1
300 CAPSULE ORAL DAILY
COMMUNITY

## 2023-07-31 NOTE — PROGRESS NOTES
SUBJECTIVE:  Clara Cool is a 34 y.o. female who is seen here for Hashimoto's hypothyroidism which she developed after starting lithium therapy for her bipolar disorder. Patient was diagnosed with Hypothyroidism approximately 2015Patient was initially diagnosed as lithium-induced hypothyroidism appears that she had slightly abnormal thyroid function test prior to starting lithium and also had positive antithyroglobulin antibodies at that time. .  Symptoms at presentation were   current complaints: fatigue- and abnormal TSH level of 45 in June 2015   history of obstructive symptoms: difficulty swallowing No, changes in voice/hoarseness No.history of radiation to patient's neck: NO  Recent iodine exposure: No  Family history includes no thyroid abnormalities. Family history of thyroid cancer:   Notes from [facility]s [facility]   \"Sajan was first seen in [facility] 8/2015 when she presented with PMH of bipolar disorder with psychotic features, who had abnormal screening thyroid studies performed while on lithium therapy. Lithium was started last March 2014, around the same time as a hospital admission for SI and psychosis. TSH at that time was only slightly elevated at 4.69 (0.43 - 4.00 mcIU/mL). Screening thyroid studies done on 6/5/15 showed low free T4: 0.6, elevated TSH: 45.8 and normal total T3: 0.73. Thyroglobulin antibodies were also positive at 750.2 ( IU/mL). 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 \"    Previously struggled with gender dysphoria in 2014 and was seen in the transgender clinic in November 2014. She was never placed on replacement and according to the parents at the time it was a phase she was too depressed at that time and hence did not know her feelings  Previously had severe mood disorders including severe depression and suicidal ideation.   She has been treated for ADHD in the past   She has bipolar dosirder  and has been on Lithium since

## 2023-09-01 DIAGNOSIS — E03.2 HYPOTHYROIDISM DUE TO MEDICATION: ICD-10-CM

## 2023-09-01 RX ORDER — LEVOTHYROXINE SODIUM 0.07 MG/1
TABLET ORAL
Qty: 30 TABLET | Refills: 1 | Status: SHIPPED | OUTPATIENT
Start: 2023-09-01

## 2023-11-03 DIAGNOSIS — E03.2 HYPOTHYROIDISM DUE TO MEDICATION: ICD-10-CM

## 2023-11-06 RX ORDER — LEVOTHYROXINE SODIUM 0.07 MG/1
TABLET ORAL
Qty: 30 TABLET | Refills: 1 | OUTPATIENT
Start: 2023-11-06

## 2023-11-14 DIAGNOSIS — E03.2 HYPOTHYROIDISM DUE TO MEDICATION: ICD-10-CM

## 2023-11-14 RX ORDER — LEVOTHYROXINE SODIUM 0.07 MG/1
TABLET ORAL
Qty: 30 TABLET | Refills: 1 | OUTPATIENT
Start: 2023-11-14

## 2023-11-22 DIAGNOSIS — E03.2 HYPOTHYROIDISM DUE TO MEDICATION: ICD-10-CM

## 2023-11-22 RX ORDER — LEVOTHYROXINE SODIUM 0.07 MG/1
75 TABLET ORAL DAILY
Qty: 30 TABLET | Refills: 1 | Status: SHIPPED | OUTPATIENT
Start: 2023-11-22 | End: 2023-11-24 | Stop reason: SDUPTHER

## 2023-11-24 ENCOUNTER — TELEPHONE (OUTPATIENT)
Dept: ENDOCRINOLOGY | Age: 28
End: 2023-11-24

## 2023-11-24 DIAGNOSIS — E03.2 HYPOTHYROIDISM DUE TO MEDICATION: ICD-10-CM

## 2023-11-24 RX ORDER — LEVOTHYROXINE SODIUM 0.07 MG/1
75 TABLET ORAL DAILY
Qty: 30 TABLET | Refills: 1 | Status: SHIPPED | OUTPATIENT
Start: 2023-11-24

## 2023-11-24 NOTE — TELEPHONE ENCOUNTER
Pt went to pharmacy to  rx and was told that the rx was cancelled. Pt said she needs rx refilled hasn't had it since Monday 11/20/23.  Pt said she doesn't see  but once a year      levothyroxine (SYNTHROID) [address] [facility] [id number] Susanna David, [facility],[address] [place] [address] [phone]  [address] [place] [address]  Phone: [phone]  Fax: [phone]       Last appointment 7/31/2023    Next none

## 2023-11-24 NOTE — TELEPHONE ENCOUNTER
Prescription refill was approved on 11/22, spoke with pharmacy to confirm they received it. Patient informed.

## 2024-02-28 DIAGNOSIS — E03.2 HYPOTHYROIDISM DUE TO MEDICATION: ICD-10-CM

## 2024-02-28 RX ORDER — LEVOTHYROXINE SODIUM 0.07 MG/1
75 TABLET ORAL DAILY
Qty: 30 TABLET | Refills: 3 | Status: SHIPPED | OUTPATIENT
Start: 2024-02-28

## 2024-06-22 LAB
ALBUMIN: 4.6 G/DL (ref 3.5–5.2)
ALP BLD-CCNC: 59 IU/L (ref 36–123)
ALT SERPL-CCNC: 12 IU/L (ref 10–40)
ANION GAP SERPL CALCULATED.3IONS-SCNC: 11 MMOL/L (ref 4–16)
AST SERPL-CCNC: 14 IU/L (ref 10–40)
BILIRUB SERPL-MCNC: 0.4 MG/DL (ref 0–1.2)
BUN BLDV-MCNC: 14 MG/DL (ref 8–26)
CALCIUM SERPL-MCNC: 9.3 MG/DL (ref 8.5–10.4)
CHLORIDE BLD-SCNC: 105 MEQ/L (ref 98–111)
CO2: 22 MMOL/L (ref 21–31)
CREAT SERPL-MCNC: 0.76 MG/DL (ref 0.6–1.2)
EGFR (CKD-EPI): 110 ML/MIN/1.73 M2
GLUCOSE BLD-MCNC: 87 MG/DL (ref 70–99)
POTASSIUM SERPL-SCNC: 4.1 MEQ/L (ref 3.6–5.1)
SODIUM BLD-SCNC: 138 MEQ/L (ref 135–145)
T3 TOTAL: 1.21 NG/ML (ref 0.8–2)
T4 FREE: 1.21 NG/DL (ref 0.93–1.7)
TOTAL PROTEIN: 7.6 G/DL (ref 6.4–8.3)
TSH ULTRASENSITIVE: 2.86 MCIU/ML (ref 0.27–4.2)

## 2024-06-25 ENCOUNTER — OFFICE VISIT (OUTPATIENT)
Dept: ENDOCRINOLOGY | Age: 29
End: 2024-06-25
Payer: COMMERCIAL

## 2024-06-25 VITALS
RESPIRATION RATE: 16 BRPM | DIASTOLIC BLOOD PRESSURE: 68 MMHG | HEIGHT: 62 IN | BODY MASS INDEX: 25.95 KG/M2 | WEIGHT: 141 LBS | HEART RATE: 83 BPM | SYSTOLIC BLOOD PRESSURE: 114 MMHG

## 2024-06-25 DIAGNOSIS — E03.2 HYPOTHYROIDISM DUE TO MEDICATION: Primary | ICD-10-CM

## 2024-06-25 DIAGNOSIS — E66.3 OVERWEIGHT (BMI 25.0-29.9): ICD-10-CM

## 2024-06-25 DIAGNOSIS — F31.11 BIPOLAR AFFECTIVE DISORDER, CURRENTLY MANIC, MILD (HCC): ICD-10-CM

## 2024-06-25 PROCEDURE — 99214 OFFICE O/P EST MOD 30 MIN: CPT | Performed by: INTERNAL MEDICINE

## 2024-06-25 NOTE — PROGRESS NOTES
upper limit of normal was for her TPO though was negative    Sleep disorder   She has been sleeping a little better     Hx of Metabolic issues   Metformin stopped September 2020, she thinks it was started to for weight loss, although review of chart it appears that because of her other psych meds like Seroquel and Latuda and Lamictal she was put on it for better metabolic profile  Her A1c was 5.3 in December 2020 rest of the labs look good she does have elevated LDL she was advised to work on fats.       HX of Bipolar 1 disorder and generalized anxiety disorder   She is on Lamictal.   Lithium stopped in June 2021 and had to resume lithium in June 2023 but she doesn't want to take it any more due to SE   She stopped Seroquel & Latuda in dec 2021   Insomnia worsened since stopping Seroquel but takes Melatonin   Once lithium was added in June 2023 she is staying more sleepy.      Reviewed and/or ordered clinical lab results Yes  Reviewed and/or ordered radiology tests Yes   Reviewed and/or ordered other diagnostic tests Yes  Made a decision to obtain old records Yes  Reviewed and summarized old records Yes    Sajan Zieglercatrinahola was counseled regarding symptoms of current diagnosis, course and complications of disease if inadequately treated, side effects of medications, diagnosis, treatment options, and prognosis, risks, benefits, complications, and alternatives of treatment, labs, imaging and other studies and treatment targets and goals.  She understands instructions and counseling.      Return in about 1 year (around 6/25/2025).    Please note that some or all of this report was generated using voice recognition software. Please notify me in case of any questions about the content of this document, as some errors in transcription may have occurred .

## 2024-06-27 DIAGNOSIS — E03.2 HYPOTHYROIDISM DUE TO MEDICATION: ICD-10-CM

## 2024-06-27 RX ORDER — LEVOTHYROXINE SODIUM 0.07 MG/1
75 TABLET ORAL DAILY
Qty: 30 TABLET | Refills: 5 | Status: SHIPPED | OUTPATIENT
Start: 2024-06-27

## 2024-11-10 ENCOUNTER — PATIENT MESSAGE (OUTPATIENT)
Dept: ENDOCRINOLOGY | Age: 29
End: 2024-11-10

## 2024-11-10 DIAGNOSIS — E03.2 HYPOTHYROIDISM DUE TO MEDICATION: ICD-10-CM

## 2024-11-11 RX ORDER — LEVOTHYROXINE SODIUM 75 UG/1
75 TABLET ORAL DAILY
Qty: 30 TABLET | Refills: 5 | Status: SHIPPED | OUTPATIENT
Start: 2024-11-11

## 2024-12-04 DIAGNOSIS — E03.2 HYPOTHYROIDISM DUE TO MEDICATION: ICD-10-CM

## 2024-12-04 RX ORDER — LEVOTHYROXINE SODIUM 75 UG/1
TABLET ORAL
Qty: 90 TABLET | Refills: 1 | Status: SHIPPED | OUTPATIENT
Start: 2024-12-04

## 2025-06-19 DIAGNOSIS — E03.2 HYPOTHYROIDISM DUE TO MEDICATION: ICD-10-CM

## 2025-06-19 RX ORDER — LEVOTHYROXINE SODIUM 75 UG/1
75 TABLET ORAL DAILY
Qty: 90 TABLET | Refills: 3 | Status: SHIPPED | OUTPATIENT
Start: 2025-06-19